# Patient Record
Sex: FEMALE | Race: WHITE | Employment: OTHER | URBAN - METROPOLITAN AREA
[De-identification: names, ages, dates, MRNs, and addresses within clinical notes are randomized per-mention and may not be internally consistent; named-entity substitution may affect disease eponyms.]

---

## 2021-09-17 ENCOUNTER — HOSPITAL ENCOUNTER (INPATIENT)
Age: 61
LOS: 5 days | Discharge: HOME | DRG: 442 | End: 2021-09-22
Attending: EMERGENCY MEDICINE | Admitting: INTERNAL MEDICINE
Payer: MEDICARE

## 2021-09-17 ENCOUNTER — APPOINTMENT (OUTPATIENT)
Dept: CT IMAGING | Age: 61
DRG: 442 | End: 2021-09-17
Attending: EMERGENCY MEDICINE
Payer: MEDICARE

## 2021-09-17 DIAGNOSIS — K72.00 ACUTE LIVER FAILURE WITHOUT HEPATIC COMA: Primary | ICD-10-CM

## 2021-09-17 PROBLEM — R91.8 PULMONARY NODULES: Status: ACTIVE | Noted: 2021-09-17

## 2021-09-17 PROBLEM — F03.90 MAJOR NEUROCOGNITIVE DISORDER (HCC): Status: ACTIVE | Noted: 2021-09-17

## 2021-09-17 PROBLEM — F03.90 DEMENTIA (HCC): Status: ACTIVE | Noted: 2021-09-17

## 2021-09-17 PROBLEM — R17 JAUNDICE: Status: ACTIVE | Noted: 2021-09-17

## 2021-09-17 LAB
ALBUMIN SERPL-MCNC: 2.9 G/DL (ref 3.5–5)
ALBUMIN/GLOB SERPL: 0.7 {RATIO} (ref 1.2–3.5)
ALP SERPL-CCNC: 972 U/L (ref 50–136)
ALT SERPL-CCNC: 1340 U/L (ref 12–65)
ANION GAP SERPL CALC-SCNC: 8 MMOL/L (ref 7–16)
APTT PPP: 34.1 SEC (ref 24.1–35.1)
AST SERPL-CCNC: 958 U/L (ref 15–37)
BASOPHILS # BLD: 0 K/UL (ref 0–0.2)
BASOPHILS NFR BLD: 1 % (ref 0–2)
BILIRUB DIRECT SERPL-MCNC: 7.4 MG/DL
BILIRUB SERPL-MCNC: 9.1 MG/DL (ref 0.2–1.1)
BUN SERPL-MCNC: 10 MG/DL (ref 6–23)
CALCIUM SERPL-MCNC: 8.7 MG/DL (ref 8.3–10.4)
CHLORIDE SERPL-SCNC: 104 MMOL/L (ref 98–107)
CO2 SERPL-SCNC: 27 MMOL/L (ref 21–32)
CREAT SERPL-MCNC: 0.32 MG/DL (ref 0.6–1)
DIFFERENTIAL METHOD BLD: ABNORMAL
EOSINOPHIL # BLD: 0.4 K/UL (ref 0–0.8)
EOSINOPHIL NFR BLD: 6 % (ref 0.5–7.8)
ERYTHROCYTE [DISTWIDTH] IN BLOOD BY AUTOMATED COUNT: 17.4 % (ref 11.9–14.6)
GLOBULIN SER CALC-MCNC: 4.2 G/DL (ref 2.3–3.5)
GLUCOSE SERPL-MCNC: 100 MG/DL (ref 65–100)
HAV IGM SER QL: NONREACTIVE
HBV CORE IGM SER QL: NONREACTIVE
HBV SURFACE AG SER QL: NONREACTIVE
HCT VFR BLD AUTO: 37.2 % (ref 35.8–46.3)
HCV AB SER QL: NONREACTIVE
HGB BLD-MCNC: 12 G/DL (ref 11.7–15.4)
IMM GRANULOCYTES # BLD AUTO: 0 K/UL (ref 0–0.5)
IMM GRANULOCYTES NFR BLD AUTO: 1 % (ref 0–5)
INR PPP: 1
LIPASE SERPL-CCNC: 197 U/L (ref 73–393)
LYMPHOCYTES # BLD: 0.8 K/UL (ref 0.5–4.6)
LYMPHOCYTES NFR BLD: 13 % (ref 13–44)
MCH RBC QN AUTO: 27.3 PG (ref 26.1–32.9)
MCHC RBC AUTO-ENTMCNC: 32.3 G/DL (ref 31.4–35)
MCV RBC AUTO: 84.5 FL (ref 79.6–97.8)
MONOCYTES # BLD: 0.7 K/UL (ref 0.1–1.3)
MONOCYTES NFR BLD: 11 % (ref 4–12)
NEUTS SEG # BLD: 4.3 K/UL (ref 1.7–8.2)
NEUTS SEG NFR BLD: 69 % (ref 43–78)
NRBC # BLD: 0 K/UL (ref 0–0.2)
PLATELET # BLD AUTO: 304 K/UL (ref 150–450)
PMV BLD AUTO: 10.9 FL (ref 9.4–12.3)
POTASSIUM SERPL-SCNC: 3.5 MMOL/L (ref 3.5–5.1)
PROT SERPL-MCNC: 7.1 G/DL (ref 6.3–8.2)
PROTHROMBIN TIME: 13.4 SEC (ref 12.6–14.5)
RBC # BLD AUTO: 4.4 M/UL (ref 4.05–5.2)
SODIUM SERPL-SCNC: 139 MMOL/L (ref 136–145)
WBC # BLD AUTO: 6.2 K/UL (ref 4.3–11.1)

## 2021-09-17 PROCEDURE — 85610 PROTHROMBIN TIME: CPT

## 2021-09-17 PROCEDURE — 85025 COMPLETE CBC W/AUTO DIFF WBC: CPT

## 2021-09-17 PROCEDURE — 99284 EMERGENCY DEPT VISIT MOD MDM: CPT

## 2021-09-17 PROCEDURE — 83690 ASSAY OF LIPASE: CPT

## 2021-09-17 PROCEDURE — 80307 DRUG TEST PRSMV CHEM ANLYZR: CPT

## 2021-09-17 PROCEDURE — 74011000258 HC RX REV CODE- 258: Performed by: EMERGENCY MEDICINE

## 2021-09-17 PROCEDURE — 65270000029 HC RM PRIVATE

## 2021-09-17 PROCEDURE — 82248 BILIRUBIN DIRECT: CPT

## 2021-09-17 PROCEDURE — 80143 DRUG ASSAY ACETAMINOPHEN: CPT

## 2021-09-17 PROCEDURE — 74011000636 HC RX REV CODE- 636: Performed by: EMERGENCY MEDICINE

## 2021-09-17 PROCEDURE — 80074 ACUTE HEPATITIS PANEL: CPT

## 2021-09-17 PROCEDURE — 74177 CT ABD & PELVIS W/CONTRAST: CPT

## 2021-09-17 PROCEDURE — 80053 COMPREHEN METABOLIC PANEL: CPT

## 2021-09-17 PROCEDURE — 85730 THROMBOPLASTIN TIME PARTIAL: CPT

## 2021-09-17 PROCEDURE — 82077 ASSAY SPEC XCP UR&BREATH IA: CPT

## 2021-09-17 PROCEDURE — 74011250636 HC RX REV CODE- 250/636: Performed by: EMERGENCY MEDICINE

## 2021-09-17 RX ORDER — SODIUM CHLORIDE 0.9 % (FLUSH) 0.9 %
5-10 SYRINGE (ML) INJECTION EVERY 8 HOURS
Status: DISCONTINUED | OUTPATIENT
Start: 2021-09-17 | End: 2021-09-22 | Stop reason: HOSPADM

## 2021-09-17 RX ORDER — POLYETHYLENE GLYCOL 3350 17 G/17G
17 POWDER, FOR SOLUTION ORAL DAILY PRN
Status: DISCONTINUED | OUTPATIENT
Start: 2021-09-17 | End: 2021-09-22 | Stop reason: HOSPADM

## 2021-09-17 RX ORDER — SODIUM CHLORIDE 0.9 % (FLUSH) 0.9 %
10 SYRINGE (ML) INJECTION
Status: COMPLETED | OUTPATIENT
Start: 2021-09-17 | End: 2021-09-17

## 2021-09-17 RX ORDER — ONDANSETRON 4 MG/1
4 TABLET, ORALLY DISINTEGRATING ORAL
Status: DISCONTINUED | OUTPATIENT
Start: 2021-09-17 | End: 2021-09-22 | Stop reason: HOSPADM

## 2021-09-17 RX ORDER — SODIUM CHLORIDE 9 MG/ML
50 INJECTION, SOLUTION INTRAVENOUS CONTINUOUS
Status: DISCONTINUED | OUTPATIENT
Start: 2021-09-17 | End: 2021-09-20

## 2021-09-17 RX ORDER — SODIUM CHLORIDE 0.9 % (FLUSH) 0.9 %
5-10 SYRINGE (ML) INJECTION AS NEEDED
Status: DISCONTINUED | OUTPATIENT
Start: 2021-09-17 | End: 2021-09-22 | Stop reason: HOSPADM

## 2021-09-17 RX ORDER — SODIUM CHLORIDE 0.9 % (FLUSH) 0.9 %
5-40 SYRINGE (ML) INJECTION EVERY 8 HOURS
Status: DISCONTINUED | OUTPATIENT
Start: 2021-09-17 | End: 2021-09-22 | Stop reason: HOSPADM

## 2021-09-17 RX ORDER — ONDANSETRON 2 MG/ML
4 INJECTION INTRAMUSCULAR; INTRAVENOUS
Status: DISCONTINUED | OUTPATIENT
Start: 2021-09-17 | End: 2021-09-22 | Stop reason: HOSPADM

## 2021-09-17 RX ORDER — SODIUM CHLORIDE 0.9 % (FLUSH) 0.9 %
5-40 SYRINGE (ML) INJECTION AS NEEDED
Status: DISCONTINUED | OUTPATIENT
Start: 2021-09-17 | End: 2021-09-22 | Stop reason: HOSPADM

## 2021-09-17 RX ORDER — ACETAMINOPHEN 325 MG/1
650 TABLET ORAL
Status: DISCONTINUED | OUTPATIENT
Start: 2021-09-17 | End: 2021-09-17

## 2021-09-17 RX ORDER — ENOXAPARIN SODIUM 100 MG/ML
40 INJECTION SUBCUTANEOUS DAILY
Status: DISCONTINUED | OUTPATIENT
Start: 2021-09-18 | End: 2021-09-17

## 2021-09-17 RX ORDER — ACETAMINOPHEN 650 MG/1
650 SUPPOSITORY RECTAL
Status: DISCONTINUED | OUTPATIENT
Start: 2021-09-17 | End: 2021-09-17

## 2021-09-17 RX ADMIN — Medication 5 ML: at 18:50

## 2021-09-17 RX ADMIN — SODIUM CHLORIDE 125 ML/HR: 900 INJECTION, SOLUTION INTRAVENOUS at 18:49

## 2021-09-17 RX ADMIN — SODIUM CHLORIDE 100 ML: 900 INJECTION, SOLUTION INTRAVENOUS at 20:27

## 2021-09-17 RX ADMIN — DIATRIZOATE MEGLUMINE AND DIATRIZOATE SODIUM 15 ML: 600; 100 SOLUTION ORAL; RECTAL at 18:49

## 2021-09-17 RX ADMIN — IOPAMIDOL 100 ML: 755 INJECTION, SOLUTION INTRAVENOUS at 20:26

## 2021-09-17 RX ADMIN — Medication 10 ML: at 20:27

## 2021-09-17 NOTE — ED TRIAGE NOTES
Patient pcems from home with mask in place. Patient reports family noticed jaundice today. Patient extremely hard of hearing. Uses dry erase board to communicate. Patient denies alcohol usage. Patient reports generalized abdominal pain.   Denies n/v. 112/70; 98.3;

## 2021-09-18 ENCOUNTER — APPOINTMENT (OUTPATIENT)
Dept: ULTRASOUND IMAGING | Age: 61
DRG: 442 | End: 2021-09-18
Attending: NURSE PRACTITIONER
Payer: MEDICARE

## 2021-09-18 PROBLEM — E80.6 HYPERBILIRUBINEMIA: Status: ACTIVE | Noted: 2021-09-18

## 2021-09-18 PROBLEM — R74.01 TRANSAMINITIS: Status: ACTIVE | Noted: 2021-09-18

## 2021-09-18 LAB
ALBUMIN SERPL-MCNC: 2.2 G/DL (ref 3.5–5)
ALBUMIN/GLOB SERPL: 0.6 {RATIO} (ref 1.2–3.5)
ALP SERPL-CCNC: 772 U/L (ref 50–136)
ALT SERPL-CCNC: 1040 U/L (ref 12–65)
AMPHET UR QL SCN: NEGATIVE
ANION GAP SERPL CALC-SCNC: 9 MMOL/L (ref 7–16)
APAP SERPL-MCNC: <2 UG/ML (ref 10–30)
AST SERPL-CCNC: 766 U/L (ref 15–37)
BARBITURATES UR QL SCN: NEGATIVE
BENZODIAZ UR QL: NEGATIVE
BILIRUB SERPL-MCNC: 8.5 MG/DL (ref 0.2–1.1)
BUN SERPL-MCNC: 7 MG/DL (ref 6–23)
CALCIUM SERPL-MCNC: 8.4 MG/DL (ref 8.3–10.4)
CANNABINOIDS UR QL SCN: NEGATIVE
CHLORIDE SERPL-SCNC: 111 MMOL/L (ref 98–107)
CO2 SERPL-SCNC: 26 MMOL/L (ref 21–32)
COCAINE UR QL SCN: NEGATIVE
CREAT SERPL-MCNC: 0.23 MG/DL (ref 0.6–1)
ETHANOL SERPL-MCNC: <3 MG/DL
GLOBULIN SER CALC-MCNC: 3.6 G/DL (ref 2.3–3.5)
GLUCOSE SERPL-MCNC: 75 MG/DL (ref 65–100)
METHADONE UR QL: NEGATIVE
OPIATES UR QL: NEGATIVE
PCP UR QL: NEGATIVE
POTASSIUM SERPL-SCNC: 3.2 MMOL/L (ref 3.5–5.1)
PROT SERPL-MCNC: 5.8 G/DL (ref 6.3–8.2)
SODIUM SERPL-SCNC: 146 MMOL/L (ref 136–145)

## 2021-09-18 PROCEDURE — 86038 ANTINUCLEAR ANTIBODIES: CPT

## 2021-09-18 PROCEDURE — 65270000029 HC RM PRIVATE

## 2021-09-18 PROCEDURE — 93976 VASCULAR STUDY: CPT

## 2021-09-18 PROCEDURE — 74011250636 HC RX REV CODE- 250/636: Performed by: EMERGENCY MEDICINE

## 2021-09-18 PROCEDURE — 83516 IMMUNOASSAY NONANTIBODY: CPT

## 2021-09-18 PROCEDURE — 36415 COLL VENOUS BLD VENIPUNCTURE: CPT

## 2021-09-18 PROCEDURE — 80053 COMPREHEN METABOLIC PANEL: CPT

## 2021-09-18 PROCEDURE — 2709999900 HC NON-CHARGEABLE SUPPLY

## 2021-09-18 PROCEDURE — 86235 NUCLEAR ANTIGEN ANTIBODY: CPT

## 2021-09-18 RX ADMIN — Medication 10 ML: at 14:14

## 2021-09-18 RX ADMIN — Medication 10 ML: at 05:54

## 2021-09-18 RX ADMIN — Medication 10 ML: at 14:15

## 2021-09-18 RX ADMIN — SODIUM CHLORIDE 125 ML/HR: 900 INJECTION, SOLUTION INTRAVENOUS at 23:01

## 2021-09-18 RX ADMIN — SODIUM CHLORIDE 125 ML/HR: 900 INJECTION, SOLUTION INTRAVENOUS at 12:46

## 2021-09-18 NOTE — PROGRESS NOTES
Progress Note    Patient: Tati Mcdonald MRN: [id number]  SSN: [id number]    YOB: 1960  Age: 62 y.o. Sex: female      Admit Date: 9/17/2021    LOS: 1 day     Assessment and Plan:   62 y.o. female with a past medical history significant for neurocognitive disorder, prior CVA, that presented in the setting of jaundice    1. Nonobstructive jaundice / Transaminitis / Hyperbilirubinemia  -Family history of Fabry disease  -No signs of hemolysis  -CT of the abdomen without acute intra-abdominal pathology  -Negative viral hepatitis panel  -Negative acetaminophen level  -Obtain abdominal Doppler ultrasound  -Obtain NELY, ASMA, AMA  -Monitor LFTs  -Gastroenterology recommendations appreciated    2. Pulmonary nodules  -Chronic  -Outpatient follow-up    3. Neurocognitive disorder  -No behavioral disturbances  -Monitor    DVT prophylaxis with SCDs      Subjective:   62 y.o. female with a past medical history significant for neurocognitive disorder, prior CVA, that presented in the setting of jaundice. Patient seen and examined at bedside. This morning the patient very little tired. Otherwise denies any chest pain, no abdominal pain, no nausea vomiting or diarrhea. Objective:     Vitals:    09/18/21 0023 09/18/21 0450 09/18/21 0813 09/18/21 1109   BP: (!) 117/57 115/72 (!) 110/57 95/61   Pulse: 77 69 71 85   Resp: 18 16     Temp: 98.1 °F (36.7 °C) 98.1 °F (36.7 °C) 98.4 °F (36.9 °C) 98.6 °F (37 °C)   SpO2: 96% 95% 98%         Intake and Output:  Current Shift: No intake/output data recorded.   Last three shifts: 09/16 1901 - 09/18 0700  In: 100 [I.V.:100]  Out: -     ROS  10 ROS negative except from stated on subjective    Physical Exam:   General: Alert, NAD  HEENT: NC/AT, EOM are intact  Neck: supple, no JVD  Cardiovascular: RRR, S1, S2, no murmurs  Respiratory: Lungs are clear, no wheezes or rales  Abdomen: Soft, NT, ND  Back: No CVA tenderness, no paraspinal tenderness  Extremities: LE without pedal edema, no erythema  Neuro: CN are intact, no focal deficits  Skin: no rash or ulcers  Psych: good mood and affect    Lab/Data Review:  I have personally reviewed patients laboratory data showing  Recent Results (from the past 24 hour(s))   BILIRUBIN, DIRECT    Collection Time: 09/17/21  5:03 PM   Result Value Ref Range    Bilirubin, direct 7.4 (H) <0.4 MG/DL   CBC WITH AUTOMATED DIFF    Collection Time: 09/17/21  5:15 PM   Result Value Ref Range    WBC 6.2 4.3 - 11.1 K/uL    RBC 4.40 4.05 - 5.2 M/uL    HGB 12.0 11.7 - 15.4 g/dL    HCT 37.2 35.8 - 46.3 %    MCV 84.5 79.6 - 97.8 FL    MCH 27.3 26.1 - 32.9 PG    MCHC 32.3 31.4 - 35.0 g/dL    RDW 17.4 (H) 11.9 - 14.6 %    PLATELET 726 233 - 135 K/uL    MPV 10.9 9.4 - 12.3 FL    ABSOLUTE NRBC 0.00 0.0 - 0.2 K/uL    DF AUTOMATED      NEUTROPHILS 69 43 - 78 %    LYMPHOCYTES 13 13 - 44 %    MONOCYTES 11 4.0 - 12.0 %    EOSINOPHILS 6 0.5 - 7.8 %    BASOPHILS 1 0.0 - 2.0 %    IMMATURE GRANULOCYTES 1 0.0 - 5.0 %    ABS. NEUTROPHILS 4.3 1.7 - 8.2 K/UL    ABS. LYMPHOCYTES 0.8 0.5 - 4.6 K/UL    ABS. MONOCYTES 0.7 0.1 - 1.3 K/UL    ABS. EOSINOPHILS 0.4 0.0 - 0.8 K/UL    ABS. BASOPHILS 0.0 0.0 - 0.2 K/UL    ABS. IMM. GRANS. 0.0 0.0 - 0.5 K/UL   METABOLIC PANEL, COMPREHENSIVE    Collection Time: 09/17/21  5:15 PM   Result Value Ref Range    Sodium 139 136 - 145 mmol/L    Potassium 3.5 3.5 - 5.1 mmol/L    Chloride 104 98 - 107 mmol/L    CO2 27 21 - 32 mmol/L    Anion gap 8 7 - 16 mmol/L    Glucose 100 65 - 100 mg/dL    BUN 10 6 - 23 MG/DL    Creatinine 0.32 (L) 0.6 - 1.0 MG/DL    GFR est AA >60 >60 ml/min/1.73m2    GFR est non-AA >60 >60 ml/min/1.73m2    Calcium 8.7 8.3 - 10.4 MG/DL    Bilirubin, total 9.1 (H) 0.2 - 1.1 MG/DL    ALT (SGPT) 1,340 (H) 12 - 65 U/L    AST (SGOT) 958 (H) 15 - 37 U/L    Alk.  phosphatase 972 (H) 50 - 136 U/L    Protein, total 7.1 6.3 - 8.2 g/dL    Albumin 2.9 (L) 3.5 - 5.0 g/dL    Globulin 4.2 (H) 2.3 - 3.5 g/dL    A-G Ratio 0.7 (L) 1.2 - 3.5     LIPASE Collection Time: 09/17/21  5:15 PM   Result Value Ref Range    Lipase 197 73 - 393 U/L   ACETAMINOPHEN    Collection Time: 09/17/21  5:15 PM   Result Value Ref Range    Acetaminophen level <2 (L) 10.0 - 30.0 ug/mL   ETHYL ALCOHOL    Collection Time: 09/17/21  5:15 PM   Result Value Ref Range    ALCOHOL(ETHYL),SERUM <3 MG/DL   HEPATITIS PANEL, ACUTE    Collection Time: 09/17/21  7:05 PM   Result Value Ref Range    Hepatitis A, IgM NONREACTIVE NR      Hepatitis B core, IgM NONREACTIVE NR      Hep B Surface Ag NONREACTIVE NR      Hepatitis C virus Ab NONREACTIVE NR     PTT    Collection Time: 09/17/21  9:40 PM   Result Value Ref Range    aPTT 34.1 24.1 - 35.1 SEC   PROTHROMBIN TIME + INR    Collection Time: 09/17/21  9:40 PM   Result Value Ref Range    Prothrombin time 13.4 12.6 - 14.5 sec    INR 1.0     METABOLIC PANEL, COMPREHENSIVE    Collection Time: 09/18/21  5:48 AM   Result Value Ref Range    Sodium 146 (H) 136 - 145 mmol/L    Potassium 3.2 (L) 3.5 - 5.1 mmol/L    Chloride 111 (H) 98 - 107 mmol/L    CO2 26 21 - 32 mmol/L    Anion gap 9 7 - 16 mmol/L    Glucose 75 65 - 100 mg/dL    BUN 7 6 - 23 MG/DL    Creatinine 0.23 (L) 0.6 - 1.0 MG/DL    GFR est AA >60 >60 ml/min/1.73m2    GFR est non-AA >60 >60 ml/min/1.73m2    Calcium 8.4 8.3 - 10.4 MG/DL    Bilirubin, total 8.5 (H) 0.2 - 1.1 MG/DL    ALT (SGPT) 1,040 (H) 12 - 65 U/L    AST (SGOT) 766 (H) 15 - 37 U/L    Alk. phosphatase 772 (H) 50 - 136 U/L    Protein, total 5.8 (L) 6.3 - 8.2 g/dL    Albumin 2.2 (L) 3.5 - 5.0 g/dL    Globulin 3.6 (H) 2.3 - 3.5 g/dL    A-G Ratio 0.6 (L) 1.2 - 3.5        All Micro Results     None           Image:  I have personally reviewed patients imaging showing  CT ABD PELV W CONT   Final Result   No acute intra-abdominal or pelvis finding identified.                DUPLEX ABD VISC ART/EMILIANA/ORGANS LIMITED (11535)    (Results Pending)        Hospital problems     Patient Active Problem List   Diagnosis Code    Jaundice R17    Pulmonary nodules R91.8    Dementia (HCC) F03.90    Major neurocognitive disorder (HCC) F01.50    Transaminitis R74.01    Hyperbilirubinemia E80.6        I have reviewed, updated, and verified this note's content and spent 38 minutes of my 42 minutes visit performing counseling and coordination of care regarding medical management.        Signed By: Quiana Gonzalez MD     September 18, 2021

## 2021-09-18 NOTE — PROGRESS NOTES
Pt states she does not think she takes any medications at home.  Attempted to call emergency contact, Cleopatra Stinson, listed but was unsuccessful

## 2021-09-18 NOTE — PROGRESS NOTES
Problem: Falls - Risk of  Goal: *Absence of Falls  Description: Document Thorntondima Castano Fall Risk and appropriate interventions in the flowsheet. Outcome: Progressing Towards Goal  Note: Fall Risk Interventions:  Mobility Interventions: Bed/chair exit alarm, Communicate number of staff needed for ambulation/transfer    Mentation Interventions: Bed/chair exit alarm, Door open when patient unattended         Elimination Interventions: Bed/chair exit alarm, Call light in reach, Toileting schedule/hourly rounds              Problem: Patient Education: Go to Patient Education Activity  Goal: Patient/Family Education  Outcome: Progressing Towards Goal     Problem: Pressure Injury - Risk of  Goal: *Prevention of pressure injury  Description: Document Linus Scale and appropriate interventions in the flowsheet.   Outcome: Progressing Towards Goal  Note: Pressure Injury Interventions:       Moisture Interventions: Absorbent underpads, Apply protective barrier, creams and emollients, Internal/External urinary devices, Check for incontinence Q2 hours and as needed    Activity Interventions: Pressure redistribution bed/mattress(bed type), Increase time out of bed         Nutrition Interventions: Document food/fluid/supplement intake    Friction and Shear Interventions: Apply protective barrier, creams and emollients, Foam dressings/transparent film/skin sealants                Problem: Patient Education: Go to Patient Education Activity  Goal: Patient/Family Education  Outcome: Progressing Towards Goal

## 2021-09-18 NOTE — PROGRESS NOTES
09/18/21 0010   Dual Skin Pressure Injury Assessment   Dual Skin Pressure Injury Assessment X   Second Care Provider (Based on [address] [place] [address]) DIONICIO Douglas   Gluteal Cleft  Fissure vs Pressure Injury   Skin Integumentary   Skin Integumentary (WDL) X   Skin Color Jaundiced   Skin Condition/Temp Warm;Dry;Flaky;Fragile   Skin Integrity Wound (add Wound LDA)   Turgor Epidermis thin w/ loss of subcut tissue    Pressure  Injury Documentation Pressure Injury Noted-See Wound LDA to Document   open sore to sacrum in between gluteal fold

## 2021-09-18 NOTE — ED PROVIDER NOTES
43-year-old lady presents with concerns about jaundice. Patient is here with her daughter who is mostly communicating as the patient apparently has baseline cognitive issues and had some deafness and does not know sign language. Daughter says that the patient recently got home from a rehab related to her cognitive issues and was noted to be jaundiced as of yesterday. The daughter says that she lives with her mother and prior to yesterday she did not notice any significant jaundice. Patient has had no fevers, vomiting, diarrhea, cough, or difficulty breathing. No prior history of gallbladder surgery. No other associated symptoms. Elements of this note were created using speech recognition software. As such, errors of speech recognition may be present. No past medical history on file. No past surgical history on file. No family history on file. Social History     Socioeconomic History    Marital status:      Spouse name: Not on file    Number of children: Not on file    Years of education: Not on file    Highest education level: Not on file   Occupational History    Not on file   Tobacco Use    Smoking status: Not on file   Substance and Sexual Activity    Alcohol use: Not on file    Drug use: Not on file    Sexual activity: Not on file   Other Topics Concern    Not on file   Social History Narrative    Not on file     Social Determinants of Health     Financial Resource Strain:     Difficulty of Paying Living Expenses:    Food Insecurity:     Worried About Running Out of Food in the Last Year:     [address] in the Last Year:    Transportation Needs:     Lack of Transportation (Medical):      Lack of Transportation (Non-Medical):    Physical Activity:     Days of Exercise per Week:     Minutes of Exercise per Session:    Stress:     Feeling of Stress :    Social Connections:     Frequency of Communication with Friends and Family:     Frequency of Social Gatherings with Friends and Family:     Attends Yarsanism Services:     Active Member of Clubs or Organizations:     Attends Club or Organization Meetings:     Marital Status:    Intimate Partner Violence:     Fear of Current or Ex-Partner:     Emotionally Abused:     Physically Abused:     Sexually Abused: ALLERGIES: Patient has no known allergies. Review of Systems   Constitutional: Negative for chills, diaphoresis and fever. HENT: Negative for congestion, rhinorrhea and sore throat. Eyes: Negative for redness and visual disturbance. Respiratory: Negative for cough, chest tightness, shortness of breath and wheezing. Cardiovascular: Negative for chest pain and palpitations. Gastrointestinal: Negative for abdominal pain, blood in stool, diarrhea, nausea and vomiting. Endocrine: Negative for polydipsia and polyuria. Genitourinary: Negative for dysuria and hematuria. Musculoskeletal: Negative for arthralgias, myalgias and neck stiffness. Skin: Positive for color change. Negative for rash. Allergic/Immunologic: Negative for environmental allergies and food allergies. Neurological: Negative for dizziness, weakness and headaches. Hematological: Negative for adenopathy. Does not bruise/bleed easily. Psychiatric/Behavioral: Negative for confusion and sleep disturbance. The patient is not nervous/anxious. Vitals:    09/17/21 1701 09/17/21 2052   BP: 119/70    Pulse: 87    Resp: 16    Temp: 97.7 °F (36.5 °C)    SpO2: 95% 95%            Physical Exam  Vitals and nursing note reviewed. Constitutional:       General: She is not in acute distress. Appearance: She is well-developed. She is not toxic-appearing. HENT:      Head: Normocephalic and atraumatic. Mouth/Throat:      Comments: Jaundice mucous membranes  Eyes:      General: Scleral icterus present. Right eye: No discharge. Left eye: No discharge.       Conjunctiva/sclera: Conjunctivae normal.      Pupils: Pupils are equal, round, and reactive to light. Cardiovascular:      Rate and Rhythm: Normal rate and regular rhythm. Heart sounds: Normal heart sounds. Pulmonary:      Effort: Pulmonary effort is normal. No respiratory distress. Breath sounds: Normal breath sounds. No wheezing or rales. Chest:      Chest wall: No tenderness. Abdominal:      General: Bowel sounds are normal. There is no distension. Palpations: Abdomen is soft. Tenderness: There is no guarding or rebound. Musculoskeletal:         General: No tenderness. Normal range of motion. Cervical back: Normal range of motion. No rigidity. Lymphadenopathy:      Cervical: No cervical adenopathy. Skin:     General: Skin is warm and dry. Coloration: Skin is jaundiced. Neurological:      General: No focal deficit present. Mental Status: She is alert and oriented to person, place, and time. Psychiatric:         Mood and Affect: Mood normal.         Behavior: Behavior normal.          MDM  Number of Diagnoses or Management Options  Diagnosis management comments: I will do a CT scan to evaluate for liver mass or other obstructive lesion. I will check a basic blood work including hepatitis panel. ED Course as of Sep 17 2201   Fri Sep 17, 2021   2114 CT scan is negative. Hepatitis panel is nonreactive.   Coags are pending.    [AC]      ED Course User Index  [AC] Sammy Clemens MD       Procedures

## 2021-09-18 NOTE — CONSULTS
[facility] Consult Note       Primary GI Physician: new    Referring Provider:      Consult Date:  9/18/2021    Admit Date:  9/17/2021    Chief Complaint:  Jaundice    Subjective:     History of Present Illness:  Patient is a 62 y.o. female with PMH CVA, memory loss with intracranial stenosis, pulmonary nodules, deafness, former tobacco abuse, seen in consultation at the request of Dr. Edgar Torres for painless jaundice. She presented to the ED 9/17 for the same, accompanied by her daughter who noted onset of jaundice that day. Patient had no n/v/d or abdominal pain, and denied fever. Labs on arrival revealed normal CBC with normal INR,  electrolytes and renal function but elevation in tbili at 9.1, alb 2.9, ALT 1340,  and alk phos 972. Direct bili 7.4. Viral hep panel negative. CT A/P with no acute abnormality to account for symptoms. Patient's daughter reported no medications prior to admission at [facility] and no otc medications on a regular basis. Drug screen negative as was Tylenol level. Previous admission at [facility] reported this summer for weakness. Patient went for rehab following that admission, recently discharged to home. Med list today is obtained from recent rehab center notes in care everywhere. She has had no prior hx of jaundice or icterus and there is no known hx of liver disease. CT A/P W CONTRAST 9/17/21  CT ABDOMEN: Only some slight dependent atelectasis findings suggested at the  bases. There is no free air, no significant ascites. Ventral abdominal wall intact. Liver is unremarkable. Gallbladder, biliary tree and pancreas not remarkable. There is not frankly  obvious liver cirrhosis changes. Stomach and duodenal sweep not remarkable. Spleen is not significantly enlarged. Adrenals and kidneys not remarkable. Aorta is normal in size. Moderate aortic atherosclerotic calcifications. No  acute vascular finding. No enlarged adenopathy.   Small bowel loops and colon nondistended with no inflammation or lesion. No acute/aggressive bone lesion.   CT PELVIS: Pelvic small bowel loops, the rectosigmoid colon and cecum are  nondistended, with no significant inflammation. No acute vascular finding. No enlarged pelvis/groin lymphadenopathy. The bladder is midline; appearance unremarkable. Uterus, adnexa not remarkable for age. No acute/aggressive bone legion.   IMPRESSION  No acute intra-abdominal or pelvis finding identified. PMH:  No past medical history on file. PSH:  No past surgical history on file.     Allergies:  No Known Allergies    Home Medications:  Prior to Admission medications    Not on File    Med list per nursing home notes in care everywhere:  Medication Dose Frequency Status Start Date End Date   Senna Tablet 8.6 MG 2 tbsp   Other / See Nurse Notes 09/08/2021     Acetaminophen Tablet 325 MG      07/06/2021     Acetaminophen Tablet 325 MG 2      07/06/2021     Ticagrelor Tablet 90 MG 1  12 h   08/31/2021     Docusate Sodium Capsule 100 MG 1  12 h   08/31/2021     Atorvastatin Calcium Tablet 80 MG 1      09/08/2021     Cholecalciferol Tablet 10 MCG (400 UNIT) 2 24 h   09/08/2021     Aspirin Tablet Chewable 81 MG  24 h   09/08/2021     Docusate Sodium Capsule 100 MG 1      09/08/2021     Ticagrelor Tablet 90 MG 1      09/08/2021          Hospital Medications:  Current Facility-Administered Medications   Medication Dose Route Frequency    sodium chloride (NS) flush 5-10 mL  5-10 mL IntraVENous Q8H    sodium chloride (NS) flush 5-10 mL  5-10 mL IntraVENous PRN    0.9% sodium chloride infusion  125 mL/hr IntraVENous CONTINUOUS    sodium chloride (NS) flush 5-40 mL  5-40 mL IntraVENous Q8H    sodium chloride (NS) flush 5-40 mL  5-40 mL IntraVENous PRN    polyethylene glycol (MIRALAX) packet 17 g  17 g Oral DAILY PRN    ondansetron (ZOFRAN ODT) tablet 4 mg  4 mg Oral Q8H PRN    Or    ondansetron (ZOFRAN) injection 4 mg  4 mg IntraVENous Q6H PRN Social History:  Social History     Tobacco Use    Smoking status: Not on file   Substance Use Topics    Alcohol use: Not on file       Family History:  No family history on file. Review of Systems:  A detailed 10 system ROS is obtained, with pertinent positives as listed above. All others are negative. Diet:  NPO    Objective:     Physical Exam:  Vitals:  Visit Vitals  BP (!) 110/57   Pulse 71   Temp 98.4 °F (36.9 °C)   Resp 16   SpO2 98%     Gen:  Pt is alert, cooperative, no acute distress; difficulty hearing but able to verbalize  Skin:  Extremities and face reveal no rashes. Jaundiced. HEENT: Sclerae icteric. Extra-occular muscles are intact. No oral ulcers. No abnormal pigmentation of the lips. The neck is supple. Cardiovascular: Regular rate and rhythm. No murmurs, gallops, or rubs. Respiratory:  Comfortable breathing with no accessory muscle use. Clear breath sounds anteriorly with no wheezes, rales, or rhonchi. GI:  Abdomen nondistended, soft, and nontender. Normal active bowel sounds. No enlargement of the liver or spleen. No masses palpable. Rectal:  Deferred  Musculoskeletal:  No pitting edema of the lower legs. Lymphatic:  No cervical or supraclavicular adenopathy.     Laboratory:    Recent Labs     09/18/21  0548 09/17/21  2140 09/17/21  1715 09/17/21  1703   WBC  --   --  6.2  --    HGB  --   --  12.0  --    HCT  --   --  37.2  --    PLT  --   --  304  --    MCV  --   --  84.5  --    *  --  139  --    K 3.2*  --  3.5  --    *  --  104  --    CO2 26  --  27  --    BUN 7  --  10  --    CREA 0.23*  --  0.32*  --    CA 8.4  --  8.7  --    GLU 75  --  100  --    *  --  972*  --    *  --  958*  --    ALT 1,040*  --  1,340*  --    TBILI 8.5*  --  9.1*  --    CBIL  --   --   --  7.4*   ALB 2.2*  --  2.9*  --    TP 5.8*  --  7.1  --    LPSE  --   --  197  --    PTP  --  13.4  --   --    INR  --  1.0  --   --    APTT  --  34.1  --   --           Assessment: Principal Problem:    Transaminitis (9/18/2021)    Active Problems:    Jaundice (9/17/2021)      Pulmonary nodules (9/17/2021)      Dementia ([address] (9/17/2021)      Major neurocognitive disorder ([address] (9/17/2021)      Hyperbilirubinemia (9/18/2021)        Plan:     63 yo female is seen in consultation for evaluation of elevated liver chemistry with jaundice. She presented to the ED yesterday accompanied by her daughter who reported onset of jaundice in the last 24 hours. She reports no n/v/d or abdominal pain and no fever/chills. Patient admitted in July to [facility] for cognitive changes and diagnosed with intracranial artery stenosis; she has had early onset of memory loss and is dependent on her family at present. Although new medications were started at that admission, her daughter reports infrequent otc medications and no etoh use. CT imaging on admission without acute findings and viral hepatitis panel is negative. Pattern of liver chemistry elevation is mixed but most likely related to cholestasis, medication-induced, or even possibly auto-immune. It does not appear obstructive. 1.  Further evaluation with serology to include NELY, ASMA, AMA  2. Hepatic ultrasound with doppler imaging  3. Follow liver chemistry which appears improved today  4. Ok for diet  5. Further recommendations to be made based on findings of above    Patient is seen and examined in collaboration with Dr. Aj Goddard. Assessment and plan as per Dr. Aj Goddard.   Ute Russo NP

## 2021-09-18 NOTE — H&P
Hospitalist History and Physical   Admit Date:  2021  6:28 PM   Name:  Marvin Christianson   Age:  62 y.o. Sex:  female  :  1960   MRN:  [id number]   Room:  [facility]    Presenting Complaint: Jaundice    Reason(s) for Admission: Jaundice [R17]  Major neurocognitive disorder ([facility] [F01.50]  Dementia ([facility] [F03.90]  Pulmonary nodules [R91.8]     History of Present Illness:   Marvin Christianson is a 62 y.o. female with a past medical history significant for major neurocognitive disorder moderate without behavioral disturbance, prior CVA about 10 years ago, and left-sided weakness, multiple focal intracranial stenosis of the arteries, pulmonary nodules suspected to be chronic secondary to her being brought up in [place], prolonged QT interval and deafness with a cochlear implant. The patient has been struggling with memory issues for years since she was in her 45s and is now completely dependent on her family. She has difficulty with short-term memory but can learn with repetition and can get herself dressed toileted and do simple meals. She was recently seen and admitted to [address] [place] [address] for left-sided weakness per her daughter. She was discharged to rehab for her weakness. She did not have a new stroke at that time. But was found to have multiple focal intracranial artery stenosis she was discharged from [facility] rehab last week Wednesday but the weakness is not really improved. She is mostly wheelchair dependent at this time per the daughter. Yesterday the patient's daughter noted that she was yellow which was new for her. She had the home health nurse see the patient as well and they also repeated advised to have the patient checked out. She denies any fevers or chills at home no recent sick contacts. No changes in medications. She denies any nausea or vomiting or diarrhea or burning with pain with micturition although the patient is known to have frequent UTIs.     She does have a family history of Fabry's disease. But the daughter does not note the patient was ever tested    Review of Systems:  10 systems reviewed and negative except as noted in HPI. Assessment & Plan: Active Problems:    Jaundice (9/17/2021)  New  Of unclear etiology  Liver pattern shows predominantly transaminase elevation concerning for intrinsic liver disease; given her history of multiple stenosis ischemia as a possible etiology  We will admit to the medicine inpatient unit  Send hepatitis panel  CT of the abdomen was done in the ER and showed no acute intra-abdominal or pelvic findings  GI consult in the a.m. Coags are pending  We will send urine drug screen  Acetaminophen and alcohol level      Pulmonary nodules (9/17/2021)  Chronic and suspected to be secondary to her living in [place].   She follows with [facility] pulmonology        Dementia/ Major neurocognitive disorder ([facility] (9/17/2021)  Without behavioral disturbances  Continue appropriate home meds per her neurologist      Deafness  Supportive care  Patient communicates by reading lips  And writing on the board        Dispo/Discharge Planning:   TBD    Further work-up and management based on her clinical course  Diet: DIET NPO except for meds until seen by GI  VTE ppx: SCDs  Code status: Full    Hospital Problems as of 9/17/2021 Never Reviewed        Codes Class Noted - Resolved POA    Jaundice ICD-10-CM: R17  ICD-9-CM: 782.4  9/17/2021 - Present Unknown        Pulmonary nodules ICD-10-CM: R91.8  ICD-9-CM: 793.19  9/17/2021 - Present Unknown        Dementia ([facility] ICD-10-CM: F03.90  ICD-9-CM: 294.20  9/17/2021 - Present Unknown        Major neurocognitive disorder ([facility] ICD-10-CM: F01.50  ICD-9-CM: 294.20  9/17/2021 - Present Unknown            Past medical history: As above in HPI    Past surgical history:  Right hip surgery    No Known Allergies   Social History     Tobacco Use    Smoking status:  Non-smoker   Substance Use Topics    Alcohol use:  Nondrinker of alcohol No use of street drugs    Family history asked and is positive for Fabry's disease    Family history reviewed and negative except as noted above. There is no immunization history on file for this patient. PTA Medications:  No current outpatient medications    Objective:     Patient Vitals for the past 24 hrs:   Temp Pulse Resp BP SpO2   09/17/21 2052     95 %   09/17/21 1701 97.7 °F (36.5 °C) 87 16 119/70 95 %     Oxygen Therapy  O2 Sat (%): 95 % (09/17/21 2052)  O2 Device: None (Room air) (09/17/21 2052)    There is no height or weight on file to calculate BMI. Intake/Output Summary (Last 24 hours) at 9/17/2021 2249  Last data filed at 9/17/2021 2052  Gross per 24 hour   Intake 100 ml   Output    Net 100 ml         Physical Exam:    General:    Well nourished. No overt distress. Jaundiced  Head:  Normocephalic, atraumatic  Eyes:  Sclerae appear normal.  Pupils equally round. ENT:  Nares appear normal, no drainage. Moist oral mucosa  Neck:  No restricted ROM. Trachea midline   CV:   RRR. No m/r/g. No jugular venous distension. Lungs:   CTAB. No wheezing, rhonchi, or rales. Respirations even, unlabored  Abdomen: Bowel sounds present. Soft, nontender, distended. Extremities: No cyanosis or clubbing. No edema  Skin:     No rashes and normal coloration. Warm and dry. Neuro:  Cranial nerves II-XII grossly intact. Sensation intact  Psych:  Normal mood and affect.   Alert and oriented x3    I have reviewed ordered lab tests and independently visualized imaging below:    Last 24hr Labs:  Recent Results (from the past 24 hour(s))   BILIRUBIN, DIRECT    Collection Time: 09/17/21  5:03 PM   Result Value Ref Range    Bilirubin, direct 7.4 (H) <0.4 MG/DL   CBC WITH AUTOMATED DIFF    Collection Time: 09/17/21  5:15 PM   Result Value Ref Range    WBC 6.2 4.3 - 11.1 K/uL    RBC 4.40 4.05 - 5.2 M/uL    HGB 12.0 11.7 - 15.4 g/dL    HCT 37.2 35.8 - 46.3 %    MCV 84.5 79.6 - 97.8 FL    MCH 27.3 26.1 - 32.9 PG    MCHC 32.3 31.4 - 35.0 g/dL    RDW 17.4 (H) 11.9 - 14.6 %    PLATELET 665 045 - 249 K/uL    MPV 10.9 9.4 - 12.3 FL    ABSOLUTE NRBC 0.00 0.0 - 0.2 K/uL    DF AUTOMATED      NEUTROPHILS 69 43 - 78 %    LYMPHOCYTES 13 13 - 44 %    MONOCYTES 11 4.0 - 12.0 %    EOSINOPHILS 6 0.5 - 7.8 %    BASOPHILS 1 0.0 - 2.0 %    IMMATURE GRANULOCYTES 1 0.0 - 5.0 %    ABS. NEUTROPHILS 4.3 1.7 - 8.2 K/UL    ABS. LYMPHOCYTES 0.8 0.5 - 4.6 K/UL    ABS. MONOCYTES 0.7 0.1 - 1.3 K/UL    ABS. EOSINOPHILS 0.4 0.0 - 0.8 K/UL    ABS. BASOPHILS 0.0 0.0 - 0.2 K/UL    ABS. IMM. GRANS. 0.0 0.0 - 0.5 K/UL   METABOLIC PANEL, COMPREHENSIVE    Collection Time: 09/17/21  5:15 PM   Result Value Ref Range    Sodium 139 136 - 145 mmol/L    Potassium 3.5 3.5 - 5.1 mmol/L    Chloride 104 98 - 107 mmol/L    CO2 27 21 - 32 mmol/L    Anion gap 8 7 - 16 mmol/L    Glucose 100 65 - 100 mg/dL    BUN 10 6 - 23 MG/DL    Creatinine 0.32 (L) 0.6 - 1.0 MG/DL    GFR est AA >60 >60 ml/min/1.73m2    GFR est non-AA >60 >60 ml/min/1.73m2    Calcium 8.7 8.3 - 10.4 MG/DL    Bilirubin, total 9.1 (H) 0.2 - 1.1 MG/DL    ALT (SGPT) 1,340 (H) 12 - 65 U/L    AST (SGOT) 958 (H) 15 - 37 U/L    Alk.  phosphatase 972 (H) 50 - 136 U/L    Protein, total 7.1 6.3 - 8.2 g/dL    Albumin 2.9 (L) 3.5 - 5.0 g/dL    Globulin 4.2 (H) 2.3 - 3.5 g/dL    A-G Ratio 0.7 (L) 1.2 - 3.5     LIPASE    Collection Time: 09/17/21  5:15 PM   Result Value Ref Range    Lipase 197 73 - 393 U/L   HEPATITIS PANEL, ACUTE    Collection Time: 09/17/21  7:05 PM   Result Value Ref Range    Hepatitis A, IgM NONREACTIVE NR      Hepatitis B core, IgM NONREACTIVE NR      Hep B Surface Ag NONREACTIVE NR      Hepatitis C virus Ab NONREACTIVE NR     PTT    Collection Time: 09/17/21  9:40 PM   Result Value Ref Range    aPTT 34.1 24.1 - 35.1 SEC   PROTHROMBIN TIME + INR    Collection Time: 09/17/21  9:40 PM   Result Value Ref Range    Prothrombin time 13.4 12.6 - 14.5 sec    INR 1.0         All Micro Results     None Other Studies:  CT ABD PELV W CONT    Result Date: 9/17/2021  CT OF THE Abdomen with Contrast and CT of the Pelvis with Contrast 9/17/2021 8:37 PM INDICATION: Acute liver failure COMPARISON: None available at this hospital PACS Technique:  Multiple contiguous axial images encompassing the abdomen and pelvis are obtained following the administration of approximately 100cc of Isovue 370 nonionic intravenous contrast by power injector. Dilute oral contrast is administered. For this CT scanner at least one of the following techniques is utilized to decrease patient radiation dose: Automatic exposure control, KVP and mA modulation based on patient weight, and iterative reconstruction. IV contrast is given to better delineate vascular structures and increase sensitivity of lesions in the solid organs. CT ABDOMEN: Only some slight dependent atelectasis findings suggested at the bases. There is no free air, no significant ascites. Ventral abdominal wall intact. Liver is unremarkable. Gallbladder, biliary tree and pancreas not remarkable. There is not frankly obvious liver cirrhosis changes. Stomach and duodenal sweep not remarkable. Spleen is not significantly enlarged. Adrenals and kidneys not remarkable. Aorta is normal in size. Moderate aortic atherosclerotic calcifications. No acute vascular finding. No enlarged adenopathy. Small bowel loops and colon nondistended with no inflammation or lesion. No acute/aggressive bone lesion. CT PELVIS: Pelvic small bowel loops, the rectosigmoid colon and cecum are nondistended, with no significant inflammation. No acute vascular finding. No enlarged pelvis/groin lymphadenopathy. The bladder is midline; appearance unremarkable. Uterus, adnexa not remarkable for age. No acute/aggressive bone legion. No acute intra-abdominal or pelvis finding identified.          Medications Administered     0.9% sodium chloride infusion     Admin Date  09/17/2021 Action  New Bag Dose  125 mL/hr Rate  125 mL/hr Route  IntraVENous Administered By  Cephus Severs          diatrizoate chloe-diatrizoat sod (MD-GASTROVIEW,GASTROGRAFIN) 66-10 % contrast solution 15 mL     Admin Date  09/17/2021 Action  Given Dose  15 mL Route  Oral Administered By  Cephus Severs          iopamidoL (ISOVUE-370) 76 % injection 100 mL     Admin Date  09/17/2021 Action  Given Dose  100 mL Route  IntraVENous Administered By  Jose, Kathleen N          saline peripheral flush soln 10 mL     Admin Date  09/17/2021 Action  Given Dose  10 mL Route  InterCATHeter Administered By  . [employer] Donkristie N          sodium chloride (NS) flush 5-10 mL     Admin Date  09/17/2021 Action  Given Dose  5 mL Route  IntraVENous Administered By  Cephus Severs          sodium chloride 0.9 % bolus infusion 100 mL     Admin Date  09/17/2021 Action  New Bag Dose  100 mL Route  IntraVENous Administered By  [employer]                  Signed:  Shanthi Workman MD    Part of this note may have been written by using a voice dictation software. The note has been proof read but may still contain some grammatical/other typographical errors.

## 2021-09-18 NOTE — ED NOTES
Patient daughter has left for the evening. Pt daughter would like a call when bed assignment is made. Daughter is also the caregiver that helps patient communicate with medical staff. Pt does have a dry erase board but the patient still relies a lot on reading her daughter's lips. Pt daughter would like to speak to the receiving nurse about a possible special exception made for her to stay with her mother.

## 2021-09-18 NOTE — PROGRESS NOTES
Problem: Falls - Risk of  Goal: *Absence of Falls  Description: Document Martina Brenner Fall Risk and appropriate interventions in the flowsheet. Outcome: Progressing Towards Goal  Note: Fall Risk Interventions:  Mobility Interventions: Bed/chair exit alarm    Mentation Interventions: Bed/chair exit alarm         Elimination Interventions: Bed/chair exit alarm              Problem: Patient Education: Go to Patient Education Activity  Goal: Patient/Family Education  Outcome: Progressing Towards Goal     Problem: Pressure Injury - Risk of  Goal: *Prevention of pressure injury  Description: Document Linus Scale and appropriate interventions in the flowsheet.   Outcome: Progressing Towards Goal  Note: Pressure Injury Interventions:       Moisture Interventions: Absorbent underpads    Activity Interventions: Pressure redistribution bed/mattress(bed type)         Nutrition Interventions: Document food/fluid/supplement intake    Friction and Shear Interventions: Apply protective barrier, creams and emollients                Problem: Patient Education: Go to Patient Education Activity  Goal: Patient/Family Education  Outcome: Progressing Towards Goal

## 2021-09-18 NOTE — ED NOTES
TRANSFER - OUT REPORT:    Verbal report given to [place] RN(name) on GUSTAVO  being transferred to 7th floor(unit) for routine progression of care       Report consisted of patients Situation, Background, Assessment and   Recommendations(SBAR). Information from the following report(s) SBAR was reviewed with the receiving nurse. Lines:   Peripheral IV 09/17/21 Left Antecubital (Active)   Site Assessment Clean, dry, & intact 09/17/21 1702   Phlebitis Assessment 0 09/17/21 1702   Infiltration Assessment 0 09/17/21 1702   Dressing Status Clean, dry, & intact 09/17/21 1702   Dressing Type 4 X 4 09/17/21 1702   Hub Color/Line Status Blue 09/17/21 1702        Opportunity for questions and clarification was provided.       Patient transported with:   Ketto

## 2021-09-18 NOTE — PROGRESS NOTES
TRANSFER - IN REPORT:    Verbal report received from Ashlie Mckeon RN(name) on GUSTAVO  being received from ED(unit) for routine progression of care      Report consisted of patients Situation, Background, Assessment and   Recommendations(SBAR). Information from the following report(s) SBAR was reviewed with the receiving nurse. Opportunity for questions and clarification was provided. Assessment completed upon patients arrival to unit and care assumed.

## 2021-09-19 LAB
ALBUMIN SERPL-MCNC: 2 G/DL (ref 3.5–5)
ALBUMIN/GLOB SERPL: 0.6 {RATIO} (ref 1.2–3.5)
ALP SERPL-CCNC: 752 U/L (ref 50–136)
ALT SERPL-CCNC: 817 U/L (ref 12–65)
ANION GAP SERPL CALC-SCNC: 7 MMOL/L (ref 7–16)
AST SERPL-CCNC: 573 U/L (ref 15–37)
BILIRUB SERPL-MCNC: 8.2 MG/DL (ref 0.2–1.1)
BUN SERPL-MCNC: 5 MG/DL (ref 6–23)
CALCIUM SERPL-MCNC: 8.4 MG/DL (ref 8.3–10.4)
CHLORIDE SERPL-SCNC: 115 MMOL/L (ref 98–107)
CO2 SERPL-SCNC: 24 MMOL/L (ref 21–32)
CREAT SERPL-MCNC: 0.23 MG/DL (ref 0.6–1)
FERRITIN SERPL-MCNC: 580 NG/ML (ref 8–388)
GLOBULIN SER CALC-MCNC: 3.3 G/DL (ref 2.3–3.5)
GLUCOSE SERPL-MCNC: 75 MG/DL (ref 65–100)
INR PPP: 1
IRON SATN MFR SERPL: 50 %
IRON SERPL-MCNC: 125 UG/DL (ref 35–150)
POTASSIUM SERPL-SCNC: 3.3 MMOL/L (ref 3.5–5.1)
PROT SERPL-MCNC: 5.3 G/DL (ref 6.3–8.2)
PROTHROMBIN TIME: 13.5 SEC (ref 12.6–14.5)
SODIUM SERPL-SCNC: 146 MMOL/L (ref 136–145)
TIBC SERPL-MCNC: 251 UG/DL (ref 250–450)

## 2021-09-19 PROCEDURE — 74011250636 HC RX REV CODE- 250/636: Performed by: EMERGENCY MEDICINE

## 2021-09-19 PROCEDURE — 83540 ASSAY OF IRON: CPT

## 2021-09-19 PROCEDURE — 80053 COMPREHEN METABOLIC PANEL: CPT

## 2021-09-19 PROCEDURE — 82728 ASSAY OF FERRITIN: CPT

## 2021-09-19 PROCEDURE — 85610 PROTHROMBIN TIME: CPT

## 2021-09-19 PROCEDURE — 36415 COLL VENOUS BLD VENIPUNCTURE: CPT

## 2021-09-19 PROCEDURE — 65270000029 HC RM PRIVATE

## 2021-09-19 PROCEDURE — 77030040393 HC DRSG OPTIFOAM GENT MDII -B

## 2021-09-19 PROCEDURE — 2709999900 HC NON-CHARGEABLE SUPPLY

## 2021-09-19 RX ADMIN — Medication 10 ML: at 06:14

## 2021-09-19 RX ADMIN — Medication 10 ML: at 16:37

## 2021-09-19 RX ADMIN — SODIUM CHLORIDE 125 ML/HR: 900 INJECTION, SOLUTION INTRAVENOUS at 17:37

## 2021-09-19 RX ADMIN — Medication 10 ML: at 23:15

## 2021-09-19 NOTE — PROGRESS NOTES
Progress Note    Patient: Sadi Allen MRN: [id number]  SSN: [id number]    YOB: 1960  Age: 62 y.o. Sex: female      Admit Date: 9/17/2021    LOS: 2 days     Assessment and Plan:   62 y.o. female with a past medical history significant for neurocognitive disorder, prior CVA, that presented in the setting of jaundice    1. Nonobstructive jaundice / Transaminitis / Hyperbilirubinemia  -Family history of Fabry disease  -No signs of hemolysis  -CT of the abdomen without acute intra-abdominal pathology  -Negative viral hepatitis panel  -Negative acetaminophen level  -Abdominal Doppler ultrasound without evidence of thrombosis   -Follow NELY, ASMA, AMA  -Monitor LFTs  -Gastroenterology recommendations appreciated    2. Pulmonary nodules  -Chronic  -Outpatient follow-up    3. Neurocognitive disorder  -No behavioral disturbances  -Monitor    DVT prophylaxis with SCDs      Subjective:   62 y.o. female with a past medical history significant for neurocognitive disorder, prior CVA, that presented in the setting of jaundice. Patient seen and examined at bedside. This morning the patient still tired. Otherwise denies any chest pain, no abdominal pain, no nausea vomiting or diarrhea. Objective:     Vitals:    09/18/21 2352 09/19/21 0317 09/19/21 0820 09/19/21 1200   BP: 107/60 117/62 112/62 113/66   Pulse: 77 74 75 80   Resp: 16 16 18 18   Temp: 98.9 °F (37.2 °C) 98.9 °F (37.2 °C) 97.9 °F (36.6 °C) 98.5 °F (36.9 °C)   SpO2: 95% 96% 100% 96%        Intake and Output:  Current Shift: No intake/output data recorded.   Last three shifts: 09/17 1901 - 09/19 0700  In: 100 [I.V.:100]  Out: -     ROS  10 ROS negative except from stated on subjective    Physical Exam:   General: Alert, NAD  HEENT: NC/AT, EOM are intact  Neck: supple, no JVD  Cardiovascular: RRR, S1, S2, no murmurs  Respiratory: Lungs are clear, no wheezes or rales  Abdomen: Soft, NT, ND  Back: No CVA tenderness, no paraspinal tenderness  Extremities: LE without pedal edema, no erythema  Neuro: CN are intact, no focal deficits  Skin: no rash or ulcers  Psych: good mood and affect    Lab/Data Review:  I have personally reviewed patients laboratory data showing  Recent Results (from the past 24 hour(s))   TRANSFERRIN SATURATION    Collection Time: 09/19/21  4:59 AM   Result Value Ref Range    Iron 125 35 - 150 ug/dL    TIBC 251 250 - 450 ug/dL    Transferrin Saturation 50 >20 %   FERRITIN    Collection Time: 09/19/21  4:59 AM   Result Value Ref Range    Ferritin 580 (H) 8 - 117 NG/ML   METABOLIC PANEL, COMPREHENSIVE    Collection Time: 09/19/21  5:00 AM   Result Value Ref Range    Sodium 146 (H) 136 - 145 mmol/L    Potassium 3.3 (L) 3.5 - 5.1 mmol/L    Chloride 115 (H) 98 - 107 mmol/L    CO2 24 21 - 32 mmol/L    Anion gap 7 7 - 16 mmol/L    Glucose 75 65 - 100 mg/dL    BUN 5 (L) 6 - 23 MG/DL    Creatinine 0.23 (L) 0.6 - 1.0 MG/DL    GFR est AA >60 >60 ml/min/1.73m2    GFR est non-AA >60 >60 ml/min/1.73m2    Calcium 8.4 8.3 - 10.4 MG/DL    Bilirubin, total 8.2 (H) 0.2 - 1.1 MG/DL    ALT (SGPT) 817 (H) 12 - 65 U/L    AST (SGOT) 573 (H) 15 - 37 U/L    Alk. phosphatase 752 (H) 50 - 136 U/L    Protein, total 5.3 (L) 6.3 - 8.2 g/dL    Albumin 2.0 (L) 3.5 - 5.0 g/dL    Globulin 3.3 2.3 - 3.5 g/dL    A-G Ratio 0.6 (L) 1.2 - 3.5     PROTHROMBIN TIME + INR    Collection Time: 09/19/21  5:00 AM   Result Value Ref Range    Prothrombin time 13.5 12.6 - 14.5 sec    INR 1.0        All Micro Results     None           Image:  I have personally reviewed patients imaging showing  DUPLEX ABD VISC ART/EMILIANA/ORGANS LIMITED (64111)   Final Result   Elevated velocities in the celiac artery and superior mesenteric   artery consistent with stenoses. Extensive atherosclerotic disease throughout   the abdominal aorta. The recent CT scan has similar findings. CT ABD PELV W CONT   Final Result   No acute intra-abdominal or pelvis finding identified. Hospital problems     Patient Active Problem List   Diagnosis Code    Jaundice R17    Pulmonary nodules R91.8    Dementia (HCC) F03.90    Major neurocognitive disorder ([facility] F01.50    Transaminitis R74.01    Hyperbilirubinemia E80.6        I have reviewed, updated, and verified this note's content and spent 36 minutes of my 40 minutes visit performing counseling and coordination of care regarding medical management.        Signed By: Cyndi Cockayne, MD     September 19, 2021

## 2021-09-19 NOTE — PROGRESS NOTES
Problem: Falls - Risk of  Goal: *Absence of Falls  Description: Document Claudia Jean Fall Risk and appropriate interventions in the flowsheet. Outcome: Progressing Towards Goal  Note: Fall Risk Interventions:  Mobility Interventions: OT consult for ADLs, Patient to call before getting OOB, PT Consult for mobility concerns    Mentation Interventions: Bed/chair exit alarm, Door open when patient unattended         Elimination Interventions: Bed/chair exit alarm, Call light in reach, Patient to call for help with toileting needs, Toileting schedule/hourly rounds              Problem: Patient Education: Go to Patient Education Activity  Goal: Patient/Family Education  Outcome: Progressing Towards Goal     Problem: Pressure Injury - Risk of  Goal: *Prevention of pressure injury  Description: Document Linus Scale and appropriate interventions in the flowsheet.   Outcome: Progressing Towards Goal  Note: Pressure Injury Interventions:       Moisture Interventions: Absorbent underpads, Check for incontinence Q2 hours and as needed    Activity Interventions: Increase time out of bed, PT/OT evaluation         Nutrition Interventions: Document food/fluid/supplement intake, Discuss nutritional consult with provider, Offer support with meals,snacks and hydration    Friction and Shear Interventions: HOB 30 degrees or less, Transferring/repositioning devices                Problem: Patient Education: Go to Patient Education Activity  Goal: Patient/Family Education  Outcome: Progressing Towards Goal

## 2021-09-19 NOTE — PROGRESS NOTES
[facility] Progress Note         Admit Date:  9/17/2021    Today's Date:  9/19/2021    CC:  Elevated liver chemistries    Subjective:     No acute events overnight. Mild nausea earlier this AM, which resolved spontaneously. Good appetite. Otherwise denies abd pain, vomiting, diarrhea, constipation. Medications:   Current Facility-Administered Medications   Medication Dose Route Frequency    sodium chloride (NS) flush 5-10 mL  5-10 mL IntraVENous Q8H    sodium chloride (NS) flush 5-10 mL  5-10 mL IntraVENous PRN    0.9% sodium chloride infusion  125 mL/hr IntraVENous CONTINUOUS    sodium chloride (NS) flush 5-40 mL  5-40 mL IntraVENous Q8H    sodium chloride (NS) flush 5-40 mL  5-40 mL IntraVENous PRN    polyethylene glycol (MIRALAX) packet 17 g  17 g Oral DAILY PRN    ondansetron (ZOFRAN ODT) tablet 4 mg  4 mg Oral Q8H PRN    Or    ondansetron (ZOFRAN) injection 4 mg  4 mg IntraVENous Q6H PRN       Review of Systems:  ROS was obtained, with pertinent positives as listed above. No chest pain or SOB. Diet:      Objective:   Vitals:  Visit Vitals  /62 (BP 1 Location: Left arm)   Pulse 75   Temp 97.9 °F (36.6 °C)   Resp 18   SpO2 100%     Intake/Output:  No intake/output data recorded.   09/17 1901 - 09/19 0700  In: 100 [I.V.:100]  Out: -     Exam:  GENERAL: Alert, cooperative, in no acute distress, jaundiced; difficulty hearing but able to verbalize  CV: Regular rate and rhythm  RESP: Clear to auscultation bilaterally anteriorly  ABD: Soft, non-tender, non-distended, normoactive bowel sounds, no organomegaly appreciated  EXTREM: Extremities normal, atraumatic, no cyanosis or edema  NEURO: Awake and alert    Data Review (Labs):    Recent Labs     09/19/21  0500 09/18/21  0548 09/17/21  2140 09/17/21  1715 09/17/21  1703   WBC  --   --   --  6.2  --    HGB  --   --   --  12.0  --    HCT  --   --   --  37.2  --    PLT  --   --   --  304  --    MCV  --   --   --  84.5  --    * 146*  --  139  --    K 3.3* 3.2*  --  3.5  --    * 111*  --  104  --    CO2 24 26  --  27  --    BUN 5* 7  --  10  --    CREA 0.23* 0.23*  --  0.32*  --    CA 8.4 8.4  --  8.7  --    GLU 75 75  --  100  --    * 772*  --  972*  --    * 1,040*  --  1,340*  --    TBILI 8.2* 8.5*  --  9.1*  --    CBIL  --   --   --   --  7.4*   ALB 2.0* 2.2*  --  2.9*  --    TP 5.3* 5.8*  --  7.1  --    LPSE  --   --   --  197  --    PTP 13.5  --  13.4  --   --    INR 1.0  --  1.0  --   --    APTT  --   --  34.1  --   --      CT A/P W CONTRAST 9/17/21  CT ABDOMEN: Only some slight dependent atelectasis findings suggested at the  bases. There is no free air, no significant ascites. Ventral abdominal wall intact. Liver is unremarkable. Gallbladder, biliary tree and pancreas not remarkable. There is not frankly  obvious liver cirrhosis changes. Stomach and duodenal sweep not remarkable. Spleen is not significantly enlarged. Adrenals and kidneys not remarkable. Aorta is normal in size. Moderate aortic atherosclerotic calcifications. No  acute vascular finding. No enlarged adenopathy. Small bowel loops and colon nondistended with no inflammation or lesion. No acute/aggressive bone lesion.   CT PELVIS: Pelvic small bowel loops, the rectosigmoid colon and cecum are  nondistended, with no significant inflammation. No acute vascular finding.  No enlarged pelvis/groin lymphadenopathy. The bladder is midline; appearance unremarkable. Uterus, adnexa not remarkable for age. No acute/aggressive bone legion.   IMPRESSION  No acute intra-abdominal or pelvis finding identified. Abdominal doppler 9/18/21:  IMPRESSION  Elevated velocities in the celiac artery and superior mesenteric  artery consistent with stenoses. Extensive atherosclerotic disease throughout  the abdominal aorta. The recent CT scan has similar findings.       Assessment:     Principal Problem:    Transaminitis (9/18/2021)    Active Problems: Jaundice (9/17/2021)      Pulmonary nodules (9/17/2021)      Dementia ([facility] [address] (9/17/2021)      Major neurocognitive disorder ([facility] [address] (9/17/2021)      Hyperbilirubinemia (9/18/2021)      Patient is a 62 y.o. female with PMH CVA, memory loss with intracranial stenosis, pulmonary nodules, deafness, former tobacco abuse who is seen in consultation for painless jaundice. She presented to the ED 9/17 for the same, accompanied by her daughter who noted onset of jaundice that day. Found to have elevated liver chemistries in mixed pattern. Otherwise asymptomatic. Viral hepatitis panel, UDS, tylenol level all negative. CT A/P with no acute abnormality to account for symptoms. Not on any obvious offending meds and no reported hypotension. Denies IVDA, tylenol, herbals, EtOH use. She has a family history of Fabry disease, although she does not have any typical manifestations of the disease, which also does not typically affect the liver. Pattern of liver chemistry elevation is mixed but may be related to cholestasis, medication-induced, or even possibly auto-immune. It does not appear to be obstructive. 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 Autoimmune serologies pending.     Plan:     - LFTs downtrending, monitor daily CMP and INR  - Follow up NELY, AMA, ASMA, iron profile  - If the aforementioned workup is unremarkable, consider expanding viral workup +/- eventual liver biopsy  - Will follow along      José Ernst MD  [facility]

## 2021-09-19 NOTE — PROGRESS NOTES
Problem: Falls - Risk of  Goal: *Absence of Falls  Description: Document [place] Fall Risk and appropriate interventions in the flowsheet. Outcome: Progressing Towards Goal  Note: Fall Risk Interventions:  Mobility Interventions: Bed/chair exit alarm, Communicate number of staff needed for ambulation/transfer    Mentation Interventions: Bed/chair exit alarm, Door open when patient unattended, More frequent rounding, Reorient patient         Elimination Interventions: Bed/chair exit alarm, Call light in reach              Problem: Patient Education: Go to Patient Education Activity  Goal: Patient/Family Education  Outcome: Progressing Towards Goal     Problem: Pressure Injury - Risk of  Goal: *Prevention of pressure injury  Description: Document [place] Scale and appropriate interventions in the flowsheet.   Outcome: Progressing Towards Goal  Note: Pressure Injury Interventions:       Moisture Interventions: Check for incontinence Q2 hours and as needed    Activity Interventions: PT/OT evaluation         Nutrition Interventions: Document food/fluid/supplement intake, Offer support with meals,snacks and hydration    Friction and Shear Interventions: HOB 30 degrees or less, Transferring/repositioning devices

## 2021-09-20 LAB
ALBUMIN SERPL-MCNC: 2 G/DL (ref 3.5–5)
ALBUMIN/GLOB SERPL: 0.6 {RATIO} (ref 1.2–3.5)
ALP SERPL-CCNC: 725 U/L (ref 50–136)
ALT SERPL-CCNC: 695 U/L (ref 12–65)
ANION GAP SERPL CALC-SCNC: 8 MMOL/L (ref 7–16)
AST SERPL-CCNC: 528 U/L (ref 15–37)
BASOPHILS # BLD: 0 K/UL (ref 0–0.2)
BASOPHILS NFR BLD: 1 % (ref 0–2)
BILIRUB SERPL-MCNC: 9.2 MG/DL (ref 0.2–1.1)
BUN SERPL-MCNC: 4 MG/DL (ref 6–23)
CALCIUM SERPL-MCNC: 8.5 MG/DL (ref 8.3–10.4)
CHLORIDE SERPL-SCNC: 115 MMOL/L (ref 98–107)
CO2 SERPL-SCNC: 24 MMOL/L (ref 21–32)
CREAT SERPL-MCNC: 0.23 MG/DL (ref 0.6–1)
DIFFERENTIAL METHOD BLD: ABNORMAL
EOSINOPHIL # BLD: 0.4 K/UL (ref 0–0.8)
EOSINOPHIL NFR BLD: 9 % (ref 0.5–7.8)
ERYTHROCYTE [DISTWIDTH] IN BLOOD BY AUTOMATED COUNT: 18.5 % (ref 11.9–14.6)
GLOBULIN SER CALC-MCNC: 3.4 G/DL (ref 2.3–3.5)
GLUCOSE SERPL-MCNC: 73 MG/DL (ref 65–100)
HCT VFR BLD AUTO: 30.7 % (ref 35.8–46.3)
HGB BLD-MCNC: 9.8 G/DL (ref 11.7–15.4)
IMM GRANULOCYTES # BLD AUTO: 0 K/UL (ref 0–0.5)
IMM GRANULOCYTES NFR BLD AUTO: 0 % (ref 0–5)
LYMPHOCYTES # BLD: 1.4 K/UL (ref 0.5–4.6)
LYMPHOCYTES NFR BLD: 31 % (ref 13–44)
MCH RBC QN AUTO: 26.7 PG (ref 26.1–32.9)
MCHC RBC AUTO-ENTMCNC: 31.9 G/DL (ref 31.4–35)
MCV RBC AUTO: 83.7 FL (ref 79.6–97.8)
MONOCYTES # BLD: 0.5 K/UL (ref 0.1–1.3)
MONOCYTES NFR BLD: 12 % (ref 4–12)
NEUTS SEG # BLD: 2.2 K/UL (ref 1.7–8.2)
NEUTS SEG NFR BLD: 48 % (ref 43–78)
NRBC # BLD: 0 K/UL (ref 0–0.2)
PLATELET # BLD AUTO: 265 K/UL (ref 150–450)
PMV BLD AUTO: 11.1 FL (ref 9.4–12.3)
POTASSIUM SERPL-SCNC: 3.3 MMOL/L (ref 3.5–5.1)
PROT SERPL-MCNC: 5.4 G/DL (ref 6.3–8.2)
RBC # BLD AUTO: 3.67 M/UL (ref 4.05–5.2)
SODIUM SERPL-SCNC: 147 MMOL/L (ref 136–145)
WBC # BLD AUTO: 4.6 K/UL (ref 4.3–11.1)

## 2021-09-20 PROCEDURE — 74011250636 HC RX REV CODE- 250/636: Performed by: INTERNAL MEDICINE

## 2021-09-20 PROCEDURE — 80053 COMPREHEN METABOLIC PANEL: CPT

## 2021-09-20 PROCEDURE — 65270000029 HC RM PRIVATE

## 2021-09-20 PROCEDURE — 87798 DETECT AGENT NOS DNA AMP: CPT

## 2021-09-20 PROCEDURE — 2709999900 HC NON-CHARGEABLE SUPPLY

## 2021-09-20 PROCEDURE — 36415 COLL VENOUS BLD VENIPUNCTURE: CPT

## 2021-09-20 PROCEDURE — 85025 COMPLETE CBC W/AUTO DIFF WBC: CPT

## 2021-09-20 RX ORDER — SODIUM CHLORIDE, SODIUM LACTATE, POTASSIUM CHLORIDE, CALCIUM CHLORIDE 600; 310; 30; 20 MG/100ML; MG/100ML; MG/100ML; MG/100ML
50 INJECTION, SOLUTION INTRAVENOUS CONTINUOUS
Status: DISCONTINUED | OUTPATIENT
Start: 2021-09-20 | End: 2021-09-22 | Stop reason: HOSPADM

## 2021-09-20 RX ADMIN — Medication 10 ML: at 21:31

## 2021-09-20 RX ADMIN — SODIUM CHLORIDE, SODIUM LACTATE, POTASSIUM CHLORIDE, AND CALCIUM CHLORIDE 50 ML/HR: 600; 310; 30; 20 INJECTION, SOLUTION INTRAVENOUS at 12:32

## 2021-09-20 RX ADMIN — Medication 10 ML: at 06:07

## 2021-09-20 NOTE — PROGRESS NOTES
Follow-up with unit staff to explore patient needs. Chaplains remain available for care.      Dennie Dyke, [address]  Board Certified

## 2021-09-20 NOTE — WOUND CARE
Patient seen for moisture associated skin damage due to incontinence. She has a 1.5 cm shallow pink ulceration in gluteal cleft fold. Cleaned and applied zinc paste in thick layer. Used white board to communicate as patient is very hard of hearing and mask was worn so she could not read lips during this consult. Answered all questions at bedside. Wound team will follow loosely, please call if needed further.

## 2021-09-20 NOTE — PROGRESS NOTES
Problem: Falls - Risk of  Goal: *Absence of Falls  Description: Document [place] Flow Fall Risk and appropriate interventions in the flowsheet.   Outcome: Progressing Towards Goal  Note: Fall Risk Interventions:  Mobility Interventions: Bed/chair exit alarm, OT consult for ADLs, Patient to call before getting OOB, PT Consult for mobility concerns    Mentation Interventions: Bed/chair exit alarm, Door open when patient unattended         Elimination Interventions: Bed/chair exit alarm, Call light in reach, Patient to call for help with toileting needs, Toileting schedule/hourly rounds

## 2021-09-20 NOTE — PROGRESS NOTES
[facility] Progress Note         Admit Date:  9/17/2021  Today's Date:  9/20/2021    CC:  Elevated liver chemistries    Subjective:     Communication was done with witting on white board. Patient denies N/V or abdominal pain. Medications:   Current Facility-Administered Medications   Medication Dose Route Frequency    lactated Ringers infusion  50 mL/hr IntraVENous CONTINUOUS    sodium chloride (NS) flush 5-10 mL  5-10 mL IntraVENous Q8H    sodium chloride (NS) flush 5-10 mL  5-10 mL IntraVENous PRN    sodium chloride (NS) flush 5-40 mL  5-40 mL IntraVENous Q8H    sodium chloride (NS) flush 5-40 mL  5-40 mL IntraVENous PRN    polyethylene glycol (MIRALAX) packet 17 g  17 g Oral DAILY PRN    ondansetron (ZOFRAN ODT) tablet 4 mg  4 mg Oral Q8H PRN    Or    ondansetron (ZOFRAN) injection 4 mg  4 mg IntraVENous Q6H PRN       Review of Systems:  ROS was obtained, with pertinent positives as listed above. No chest pain or SOB. Diet:  Regular    Objective:   Vitals:  Visit Vitals  /72 (BP 1 Location: Left arm, BP Patient Position: At rest)   Pulse 60   Temp 97.8 °F (36.6 °C)   Resp 18   SpO2 98%     Intake/Output:  No intake/output data recorded. No intake/output data recorded. Exam:  GENERAL: Alert, cooperative, in NAD.  Jaundiced; difficulty hearing but able to verbalize  CV: RRR  RESP: CTA ant  ABD: Soft, non-tender, non-distended, NABS, no organomegaly appreciated  EXTREM: Extremities normal, atraumatic, no cyanosis or edema  NEURO: Awake and alert    Data Review (Labs):    Recent Labs     09/20/21  0439 09/19/21  0500 09/18/21  0548 09/17/21  2140 09/17/21  1715 09/17/21  1703   WBC 4.6  --   --   --  6.2  --    HGB 9.8*  --   --   --  12.0  --    HCT 30.7*  --   --   --  37.2  --      --   --   --  304  --    MCV 83.7  --   --   --  84.5  --    * 146* 146*  --  139  --    K 3.3* 3.3* 3.2*  --  3.5  --    * 115* 111*  --  104  --    CO2 24 24 26  --  27 --    BUN 4* 5* 7  --  10  --    CREA 0.23* 0.23* 0.23*  --  0.32*  --    CA 8.5 8.4 8.4  --  8.7  --    GLU 73 75 75  --  100  --    * 752* 772*  --  972*  --    * 817* 1,040*  --  1,340*  --    TBILI 9.2* 8.2* 8.5*  --  9.1*  --    CBIL  --   --   --   --   --  7.4*   ALB 2.0* 2.0* 2.2*  --  2.9*  --    TP 5.4* 5.3* 5.8*  --  7.1  --    LPSE  --   --   --   --  197  --    PTP  --  13.5  --  13.4  --   --    INR  --  1.0  --  1.0  --   --    APTT  --   --   --  34.1  --   --      CT A/P W CONTRAST 9/17/21  CT ABDOMEN: Only some slight dependent atelectasis findings suggested at the  bases. There is no free air, no significant ascites. Ventral abdominal wall intact. Liver is unremarkable. Gallbladder, biliary tree and pancreas not remarkable. There is not frankly  obvious liver cirrhosis changes. Stomach and duodenal sweep not remarkable. Spleen is not significantly enlarged. Adrenals and kidneys not remarkable. Aorta is normal in size. Moderate aortic atherosclerotic calcifications. No  acute vascular finding. No enlarged adenopathy. Small bowel loops and colon nondistended with no inflammation or lesion. No acute/aggressive bone lesion.   CT PELVIS: Pelvic small bowel loops, the rectosigmoid colon and cecum are  nondistended, with no significant inflammation. No acute vascular finding.  No enlarged pelvis/groin lymphadenopathy. The bladder is midline; appearance unremarkable. Uterus, adnexa not remarkable for age. No acute/aggressive bone legion.   IMPRESSION  No acute intra-abdominal or pelvis finding identified. Abdominal doppler 9/18/21:  IMPRESSION  Elevated velocities in the celiac artery and superior mesenteric  artery consistent with stenoses. Extensive atherosclerotic disease throughout  the abdominal aorta. The recent CT scan has similar findings.       Assessment:     Principal Problem:  Transaminitis (9/18/2021)    Active Problems:  Jaundice (9/17/2021)  Pulmonary nodules (9/17/2021)  Dementia ([facility] [address] (9/17/2021)  Major neurocognitive disorder ([facility] [address] (9/17/2021)  Hyperbilirubinemia (9/18/2021)    62 y.o. female with PMH CVA, memory loss with intracranial stenosis, pulmonary nodules, deafness, former tobacco abuse who is seen in consultation for painless jaundice. She presented to the ED 9/17 for the same, accompanied by her daughter who noted onset of jaundice that day. Found to have elevated liver chemistries in mixed pattern. Otherwise asymptomatic. Viral hepatitis panel, UDS, tylenol level all negative. CT A/P with no acute abnormality to account for symptoms. Not on any obvious offending meds and no reported hypotension. Denies IVDA, tylenol, herbals, EtOH use. She has a family history of Fabry disease, although she does not have any typical manifestations of the disease, which also does not typically affect the liver. Pattern of liver chemistry elevation is mixed but may be related to cholestasis, medication-induced, or even possibly auto-immune. It does not appear to be obstructive. 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 Autoimmune serologies pending. Plan:     - Transaminases down trending. INR remains at 1.0.   - Follow up NELY, AMA, ASMA. - If the aforementioned workup is unremarkable, consider expanding viral workup +/- eventual liver biopsy      Don Duffy PA-C  [facility], [facility]--addendum--patient seen and examined. Agree with above. Patient asymptomatic with a stable protime and decreasing transaminases. Will check EB and CMV viral titers while awaiting autoimmune studies.   Thanks Evonne Clancy MD

## 2021-09-20 NOTE — MED STUDENT NOTES
*ATTENTION:  This note has been created by a medical student for educational purposes only. Please do not refer to the content of this note for clinical decision-making, billing, or other purposes. Please see attending physicians note to obtain clinical information on this patient. *              [employer] Hospitalist Progress Note     Name:  Kleber Barrow  Age:60 y.o. Sex:female   :  1960    MRN:  [id number]     Admit Date:  2021    Reason for Admission:  Jaundice [R17]  Major neurocognitive disorder ([facility] [address] [F01.50]  Dementia ([facility] [address] [F03.90]  Pulmonary nodules [R91.8]    Assessment & Plan   (1.) nonobstructive jaundice/ transaminitis/ hyperbilirubinemia  -no signs of hemolysis  -CT of ABD without acute intra-abdominal pathology  -negative viral hep. Panel  -negative acetaminophen level  -ABD doppler U/S without evidence of thrombosis  -follow NELY, ASMA, AMA  -monitor LFTs, has been trending down  -GI following    (2.) pulmonary nodules  -chronic, stable  -continue outpatient follow-up    (3.) neurocognitive disorder  -no behavioral disturbances  -monitor      Diet:  DIET ADULT  DVT PPx: SCDs  Code status: Full Code  Disposition/Expected LOS: 2 days      Subjective (21):  62year old female with no significant medical history presents with generalized abdominal pain on  and was admitted for jaundice. Patient has prior CVA about 10 years ago affecting left side. Family history is positive for Fabry's disease but patient was never tested. Patient denies any symptoms of fever, chills, SOB, cough, nausea, vomiting, or diarrhea.     21: Patient was comfortably watching tv. Still jaundice but without complaints. She states that she is feeling great. Patient denies chest pain, SOB, abdominal pain, NVD, or constipation. Review of Systems: 14 point review of systems is otherwise negative with the exception of the elements mentioned above.     Objective:     Patient Vitals for the past 24 hrs:   Temp Pulse Resp BP SpO2   09/20/21 0706 97.5 °F (36.4 °C) 69 18 125/70 93 %   09/20/21 0449 98.3 °F (36.8 °C) 66 18 (!) 129/58 93 %   09/19/21 2351 98.6 °F (37 °C) 72 18 127/63 95 %   09/19/21 2051 98.5 °F (36.9 °C) 75 20 133/70 93 %   09/19/21 1600 98.6 °F (37 °C) 96 18 124/61 96 %   09/19/21 1200 98.5 °F (36.9 °C) 80 18 113/66 96 %     Oxygen Therapy  O2 Sat (%): 93 % (09/20/21 0706)  O2 Device: None (Room air) (09/18/21 2038)    There is no height or weight on file to calculate BMI. Physical Exam:   General:  WDWN female in no acute distress, speaking in full sentences, no use of accessory muscles. Hard of hearing. HEENT: Sclera is icteric. NCAT. Neck is supple without lymphadenopathy. Lungs:  Clear to auscultation bilaterally. No wheezing, rales, or rhonchi. CV:  Regular rate and rhythm with normal S1 and S2. No m/r/g. Abdomen:  Soft, nontender, nondistended, normoactive bowel sounds. Extremities:  No cyanosis clubbing or edema. Neuro:  Nonfocal, A&O x3. Grossly intact. Read and answered all questions appropriately. Skin:   With jaundice but no rash or lesions noted. Psych:  Normal affect and mood. Data Review:  I have reviewed all labs, meds, and studies from the last 24 hours:    Labs:    Recent Results (from the past 24 hour(s))   METABOLIC PANEL, COMPREHENSIVE    Collection Time: 09/20/21  4:39 AM   Result Value Ref Range    Sodium 147 (H) 136 - 145 mmol/L    Potassium 3.3 (L) 3.5 - 5.1 mmol/L    Chloride 115 (H) 98 - 107 mmol/L    CO2 24 21 - 32 mmol/L    Anion gap 8 7 - 16 mmol/L    Glucose 73 65 - 100 mg/dL    BUN 4 (L) 6 - 23 MG/DL    Creatinine 0.23 (L) 0.6 - 1.0 MG/DL    GFR est AA >60 >60 ml/min/1.73m2    GFR est non-AA >60 >60 ml/min/1.73m2    Calcium 8.5 8.3 - 10.4 MG/DL    Bilirubin, total 9.2 (H) 0.2 - 1.1 MG/DL    ALT (SGPT) 695 (H) 12 - 65 U/L    AST (SGOT) 528 (H) 15 - 37 U/L    Alk.  phosphatase 725 (H) 50 - 136 U/L    Protein, total 5.4 (L) 6.3 - 8.2 g/dL    Albumin 2.0 (L) 3.5 - 5.0 g/dL    Globulin 3.4 2.3 - 3.5 g/dL    A-G Ratio 0.6 (L) 1.2 - 3.5     CBC WITH AUTOMATED DIFF    Collection Time: 09/20/21  4:39 AM   Result Value Ref Range    WBC 4.6 4.3 - 11.1 K/uL    RBC 3.67 (L) 4.05 - 5.2 M/uL    HGB 9.8 (L) 11.7 - 15.4 g/dL    HCT 30.7 (L) 35.8 - 46.3 %    MCV 83.7 79.6 - 97.8 FL    MCH 26.7 26.1 - 32.9 PG    MCHC 31.9 31.4 - 35.0 g/dL    RDW 18.5 (H) 11.9 - 14.6 %    PLATELET 220 364 - 006 K/uL    MPV 11.1 9.4 - 12.3 FL    ABSOLUTE NRBC 0.00 0.0 - 0.2 K/uL    DF AUTOMATED      NEUTROPHILS 48 43 - 78 %    LYMPHOCYTES 31 13 - 44 %    MONOCYTES 12 4.0 - 12.0 %    EOSINOPHILS 9 (H) 0.5 - 7.8 %    BASOPHILS 1 0.0 - 2.0 %    IMMATURE GRANULOCYTES 0 0.0 - 5.0 %    ABS. NEUTROPHILS 2.2 1.7 - 8.2 K/UL    ABS. LYMPHOCYTES 1.4 0.5 - 4.6 K/UL    ABS. MONOCYTES 0.5 0.1 - 1.3 K/UL    ABS. EOSINOPHILS 0.4 0.0 - 0.8 K/UL    ABS. BASOPHILS 0.0 0.0 - 0.2 K/UL    ABS. IMM. GRANS. 0.0 0.0 - 0.5 K/UL       All Micro Results     None          EKG Results     None          Other Studies:  No results found.     Current Meds:   Current Facility-Administered Medications   Medication Dose Route Frequency    sodium chloride (NS) flush 5-10 mL  5-10 mL IntraVENous Q8H    sodium chloride (NS) flush 5-10 mL  5-10 mL IntraVENous PRN    0.9% sodium chloride infusion  50 mL/hr IntraVENous CONTINUOUS    sodium chloride (NS) flush 5-40 mL  5-40 mL IntraVENous Q8H    sodium chloride (NS) flush 5-40 mL  5-40 mL IntraVENous PRN    polyethylene glycol (MIRALAX) packet 17 g  17 g Oral DAILY PRN    ondansetron (ZOFRAN ODT) tablet 4 mg  4 mg Oral Q8H PRN    Or    ondansetron (ZOFRAN) injection 4 mg  4 mg IntraVENous Q6H PRN       Problem List:  Hospital Problems as of 9/20/2021 Never Reviewed        Codes Class Noted - Resolved POA    * (Principal) Transaminitis ICD-10-CM: R74.01  ICD-9-CM: 790.4  9/18/2021 - Present Unknown        Hyperbilirubinemia ICD-10-CM: E80.6  ICD-9-CM: 782.4  9/18/2021 - Present Unknown        Jaundice ICD-10-CM: R17  ICD-9-CM: 782.4  9/17/2021 - Present Unknown        Pulmonary nodules ICD-10-CM: R91.8  ICD-9-CM: 793.19  9/17/2021 - Present Unknown        Dementia ([facility] [address] ICD-10-CM: F03.90  ICD-9-CM: 294.20  9/17/2021 - Present Unknown        [facility] ICD-10-CM: F01.50  ICD-9-CM: 294.20  9/17/2021 - Present Unknown             Part of this note was written by using a voice dictation software and the note has been proof read but may still contain some grammatical/other typographical errors.     Signed By: Tyrell Gudino   [facility] Hospitalist Service    September 20, 2021  5:15 PM

## 2021-09-20 NOTE — PROGRESS NOTES
Chart screened by  for discharge planning. No needs identified at this time. Please consult  if any new issues arise.     Care Management Interventions  Physical Therapy Consult: No  Occupational Therapy Consult: No  Discharge Location  Discharge Placement: Home

## 2021-09-20 NOTE — PROGRESS NOTES
Progress Note    Patient: Betzaida Hernández MRN: [id number]  SSN: [id number]    YOB: 1960  Age: 62 y.o. Sex: female      Admit Date: 9/17/2021    LOS: 3 days     Assessment and Plan:   62 y.o. female with a past medical history significant for neurocognitive disorder, prior CVA, that presented in the setting of jaundice    1. Nonobstructive jaundice / Transaminitis / Hyperbilirubinemia  -Family history of Fabry disease  -No signs of hemolysis  -CT of the abdomen without acute intra-abdominal pathology  -Negative viral hepatitis panel  -Negative acetaminophen level  -Abdominal Doppler ultrasound without evidence of thrombosis   -NELY, ASMA, AMA pendign  -Monitor LFTs  -Might need liver biopsy   -Gastroenterology recommendations appreciated    2. Pulmonary nodules  -Chronic  -Outpatient follow-up    3. Neurocognitive disorder  -No behavioral disturbances  -Monitor    DVT prophylaxis with SCDs      Subjective:   62 y.o. female with a past medical history significant for neurocognitive disorder, prior CVA, that presented in the setting of jaundice. Patient seen and examined at bedside. This morning the patient feeling better. Denies any chest pain, no abdominal pain, no nausea vomiting or diarrhea. Objective:     Vitals:    09/19/21 2351 09/20/21 0449 09/20/21 0706 09/20/21 1234   BP: 127/63 (!) 129/58 125/70 135/72   Pulse: 72 66 69 60   Resp: 18 18 18 18   Temp: 98.6 °F (37 °C) 98.3 °F (36.8 °C) 97.5 °F (36.4 °C) 97.8 °F (36.6 °C)   SpO2: 95% 93% 93% 98%        Intake and Output:  Current Shift: No intake/output data recorded. Last three shifts: No intake/output data recorded.     ROS  10 ROS negative except from stated on subjective    Physical Exam:   General: Alert, NAD  HEENT: NC/AT, EOM are intact  Neck: supple, no JVD  Cardiovascular: RRR, S1, S2, no murmurs  Respiratory: Lungs are clear, no wheezes or rales  Abdomen: Soft, NT, ND  Back: No CVA tenderness, no paraspinal tenderness  Extremities: LE without pedal edema, no erythema  Neuro: CN are intact, no focal deficits  Skin: no rash or ulcers  Psych: good mood and affect    Lab/Data Review:  I have personally reviewed patients laboratory data showing  Recent Results (from the past 24 hour(s))   METABOLIC PANEL, COMPREHENSIVE    Collection Time: 09/20/21  4:39 AM   Result Value Ref Range    Sodium 147 (H) 136 - 145 mmol/L    Potassium 3.3 (L) 3.5 - 5.1 mmol/L    Chloride 115 (H) 98 - 107 mmol/L    CO2 24 21 - 32 mmol/L    Anion gap 8 7 - 16 mmol/L    Glucose 73 65 - 100 mg/dL    BUN 4 (L) 6 - 23 MG/DL    Creatinine 0.23 (L) 0.6 - 1.0 MG/DL    GFR est AA >60 >60 ml/min/1.73m2    GFR est non-AA >60 >60 ml/min/1.73m2    Calcium 8.5 8.3 - 10.4 MG/DL    Bilirubin, total 9.2 (H) 0.2 - 1.1 MG/DL    ALT (SGPT) 695 (H) 12 - 65 U/L    AST (SGOT) 528 (H) 15 - 37 U/L    Alk. phosphatase 725 (H) 50 - 136 U/L    Protein, total 5.4 (L) 6.3 - 8.2 g/dL    Albumin 2.0 (L) 3.5 - 5.0 g/dL    Globulin 3.4 2.3 - 3.5 g/dL    A-G Ratio 0.6 (L) 1.2 - 3.5     CBC WITH AUTOMATED DIFF    Collection Time: 09/20/21  4:39 AM   Result Value Ref Range    WBC 4.6 4.3 - 11.1 K/uL    RBC 3.67 (L) 4.05 - 5.2 M/uL    HGB 9.8 (L) 11.7 - 15.4 g/dL    HCT 30.7 (L) 35.8 - 46.3 %    MCV 83.7 79.6 - 97.8 FL    MCH 26.7 26.1 - 32.9 PG    MCHC 31.9 31.4 - 35.0 g/dL    RDW 18.5 (H) 11.9 - 14.6 %    PLATELET 107 834 - 545 K/uL    MPV 11.1 9.4 - 12.3 FL    ABSOLUTE NRBC 0.00 0.0 - 0.2 K/uL    DF AUTOMATED      NEUTROPHILS 48 43 - 78 %    LYMPHOCYTES 31 13 - 44 %    MONOCYTES 12 4.0 - 12.0 %    EOSINOPHILS 9 (H) 0.5 - 7.8 %    BASOPHILS 1 0.0 - 2.0 %    IMMATURE GRANULOCYTES 0 0.0 - 5.0 %    ABS. NEUTROPHILS 2.2 1.7 - 8.2 K/UL    ABS. LYMPHOCYTES 1.4 0.5 - 4.6 K/UL    ABS. MONOCYTES 0.5 0.1 - 1.3 K/UL    ABS. EOSINOPHILS 0.4 0.0 - 0.8 K/UL    ABS. BASOPHILS 0.0 0.0 - 0.2 K/UL    ABS. IMM.  GRANS. 0.0 0.0 - 0.5 K/UL      All Micro Results     None           Image:  I have personally reviewed patients imaging showing  DUPLEX ABD VISC ART/EMILIANA/ORGANS LIMITED ([id number])   Final Result   Elevated velocities in the celiac artery and superior mesenteric   artery consistent with stenoses. Extensive atherosclerotic disease throughout   the abdominal aorta. The recent CT scan has similar findings. CT ABD PELV W CONT   Final Result   No acute intra-abdominal or pelvis finding identified.                     Hospital problems     Patient Active Problem List   Diagnosis Code    Jaundice R17    Pulmonary nodules R91.8    Dementia ([facility] F03.90    Major neurocognitive disorder ([address] F01.50    Transaminitis R74.01    Hyperbilirubinemia E80.6        Signed By: Maciej Cosme MD     September 20, 2021

## 2021-09-21 ENCOUNTER — APPOINTMENT (OUTPATIENT)
Dept: ULTRASOUND IMAGING | Age: 61
DRG: 442 | End: 2021-09-21
Attending: INTERNAL MEDICINE
Payer: MEDICARE

## 2021-09-21 LAB
ACTIN IGG SERPL-ACNC: 16 UNITS (ref 0–19)
ALBUMIN SERPL-MCNC: 2.1 G/DL (ref 3.5–5)
ALBUMIN/GLOB SERPL: 0.6 {RATIO} (ref 1.2–3.5)
ALP SERPL-CCNC: 802 U/L (ref 50–136)
ALT SERPL-CCNC: 633 U/L (ref 12–65)
ANA SER QL: POSITIVE
ANION GAP SERPL CALC-SCNC: 8 MMOL/L (ref 7–16)
AST SERPL-CCNC: 454 U/L (ref 15–37)
BASOPHILS # BLD: 0 K/UL (ref 0–0.2)
BASOPHILS NFR BLD: 1 % (ref 0–2)
BILIRUB SERPL-MCNC: 9.3 MG/DL (ref 0.2–1.1)
BUN SERPL-MCNC: 7 MG/DL (ref 6–23)
CALCIUM SERPL-MCNC: 8.7 MG/DL (ref 8.3–10.4)
CENTROMERE B AB SER-ACNC: <0.2 AI (ref 0–0.9)
CHLORIDE SERPL-SCNC: 112 MMOL/L (ref 98–107)
CHROMATIN AB SERPL-ACNC: <0.2 AI (ref 0–0.9)
CO2 SERPL-SCNC: 24 MMOL/L (ref 21–32)
CREAT SERPL-MCNC: 0.27 MG/DL (ref 0.6–1)
DIFFERENTIAL METHOD BLD: ABNORMAL
DSDNA AB SER-ACNC: <1 IU/ML (ref 0–9)
ENA JO1 AB SER-ACNC: <0.2 AI (ref 0–0.9)
ENA RNP AB SER-ACNC: >8 AI (ref 0–0.9)
ENA SCL70 AB SER-ACNC: <0.2 AI (ref 0–0.9)
ENA SM AB SER-ACNC: <0.2 AI (ref 0–0.9)
ENA SS-A AB SER-ACNC: <0.2 AI (ref 0–0.9)
ENA SS-B AB SER-ACNC: <0.2 AI (ref 0–0.9)
EOSINOPHIL # BLD: 0.4 K/UL (ref 0–0.8)
EOSINOPHIL NFR BLD: 9 % (ref 0.5–7.8)
ERYTHROCYTE [DISTWIDTH] IN BLOOD BY AUTOMATED COUNT: 18.5 % (ref 11.9–14.6)
GLOBULIN SER CALC-MCNC: 3.8 G/DL (ref 2.3–3.5)
GLUCOSE SERPL-MCNC: 84 MG/DL (ref 65–100)
HCT VFR BLD AUTO: 33 % (ref 35.8–46.3)
HGB BLD-MCNC: 10.5 G/DL (ref 11.7–15.4)
IMM GRANULOCYTES # BLD AUTO: 0 K/UL (ref 0–0.5)
IMM GRANULOCYTES NFR BLD AUTO: 0 % (ref 0–5)
INR PPP: 1
LYMPHOCYTES # BLD: 1.1 K/UL (ref 0.5–4.6)
LYMPHOCYTES NFR BLD: 24 % (ref 13–44)
MCH RBC QN AUTO: 27.6 PG (ref 26.1–32.9)
MCHC RBC AUTO-ENTMCNC: 31.8 G/DL (ref 31.4–35)
MCV RBC AUTO: 86.8 FL (ref 79.6–97.8)
MITOCHONDRIA M2 IGG SER-ACNC: <20 UNITS (ref 0–20)
MONOCYTES # BLD: 0.5 K/UL (ref 0.1–1.3)
MONOCYTES NFR BLD: 11 % (ref 4–12)
NEUTS SEG # BLD: 2.5 K/UL (ref 1.7–8.2)
NEUTS SEG NFR BLD: 55 % (ref 43–78)
NRBC # BLD: 0 K/UL (ref 0–0.2)
PLATELET # BLD AUTO: 279 K/UL (ref 150–450)
PMV BLD AUTO: 11 FL (ref 9.4–12.3)
POTASSIUM SERPL-SCNC: 3.4 MMOL/L (ref 3.5–5.1)
PROT SERPL-MCNC: 5.9 G/DL (ref 6.3–8.2)
PROTHROMBIN TIME: 13.6 SEC (ref 12.6–14.5)
RBC # BLD AUTO: 3.8 M/UL (ref 4.05–5.2)
SEE BELOW, 164869: ABNORMAL
SODIUM SERPL-SCNC: 144 MMOL/L (ref 136–145)
WBC # BLD AUTO: 4.6 K/UL (ref 4.3–11.1)

## 2021-09-21 PROCEDURE — 76700 US EXAM ABDOM COMPLETE: CPT

## 2021-09-21 PROCEDURE — 85025 COMPLETE CBC W/AUTO DIFF WBC: CPT

## 2021-09-21 PROCEDURE — 85610 PROTHROMBIN TIME: CPT

## 2021-09-21 PROCEDURE — 77030038269 HC DRN EXT URIN PURWCK BARD -A

## 2021-09-21 PROCEDURE — 80053 COMPREHEN METABOLIC PANEL: CPT

## 2021-09-21 PROCEDURE — 36415 COLL VENOUS BLD VENIPUNCTURE: CPT

## 2021-09-21 PROCEDURE — 74011250637 HC RX REV CODE- 250/637: Performed by: INTERNAL MEDICINE

## 2021-09-21 PROCEDURE — 65270000029 HC RM PRIVATE

## 2021-09-21 RX ORDER — CHOLECALCIFEROL (VITAMIN D3) 125 MCG
400 CAPSULE ORAL 2 TIMES DAILY
COMMUNITY

## 2021-09-21 RX ORDER — ATORVASTATIN CALCIUM 80 MG/1
80 TABLET, FILM COATED ORAL DAILY
COMMUNITY
End: 2021-09-22

## 2021-09-21 RX ORDER — DOCUSATE SODIUM 100 MG/1
100 CAPSULE, LIQUID FILLED ORAL 2 TIMES DAILY
COMMUNITY

## 2021-09-21 RX ORDER — POTASSIUM CHLORIDE 20 MEQ/1
20 TABLET, EXTENDED RELEASE ORAL
Status: COMPLETED | OUTPATIENT
Start: 2021-09-21 | End: 2021-09-21

## 2021-09-21 RX ADMIN — Medication 10 ML: at 20:49

## 2021-09-21 RX ADMIN — Medication 10 ML: at 04:59

## 2021-09-21 RX ADMIN — POTASSIUM CHLORIDE 20 MEQ: 1500 TABLET, EXTENDED RELEASE ORAL at 10:28

## 2021-09-21 NOTE — MED STUDENT NOTES
*ATTENTION:  This note has been created by a medical student for educational purposes only. Please do not refer to the content of this note for clinical decision-making, billing, or other purposes. Please see attending physicians note to obtain clinical information on this patient. *              [employer] Hospitalist Progress Note     Name:  Sylvain Horvath  Age:60 y.o. Sex:female   :  1960    MRN:  [id number]     Admit Date:  2021    Reason for Admission:  Jaundice [R17]  Major neurocognitive disorder ([facility] [address] [F01.50]  Dementia ([facility] [F03.90]  Pulmonary nodules [R91.8]      Assessment & Plan   (1.) nonobstructive jaundice/ transaminitis/ hyperbilirubinemia  -no signs of hemolysis  -CT of ABD without acute intra-abdominal pathology  -negative viral hep. Panel  -negative acetaminophen level  -ABD doppler U/S without evidence of thrombosis  -NELY, ASMA, AMA pending  -monitor LFTs, has been trending down (improvement from  to 633,  to 454)  -GI following, addition of CMV and EBV  -if all pending results are negative will need liver biopsy     (2.) pulmonary nodules  -chronic, stable  -continue outpatient follow-up     (3.) neurocognitive disorder  -no behavioral disturbances  -monitor      Diet:  DIET ADULT  DVT PPx: SCDs  Code status: Full Code  Disposition/Expected LOS: 4 days      Subjective (21):  62year old female with no significant medical history presents with generalized abdominal pain on  and was admitted for jaundice. Patient has prior CVA about 10 years ago affecting left side. Family history is positive for Fabry's disease but patient was never tested. Patient denies any symptoms of fever, chills, SOB, cough, nausea, vomiting, or diarrhea.      21: Patient was comfortably watching tv. Still jaundice but without complaints. She states that she is feeling great. Patient denies chest pain, SOB, abdominal pain, NVD, or constipation.     21: Patient resting comfortably. Still jaundice but without complaints. Color has improved since yesterday. Patient denies any chest pain, SOB, abdominal pain, NVD, or constipation. She states that she rested well without any problems last night. Review of Systems: 14 point review of systems is otherwise negative with the exception of the elements mentioned above. Objective:     Patient Vitals for the past 24 hrs:   Temp Pulse Resp BP SpO2   09/21/21 0742 98.5 °F (36.9 °C) 64 18 (!) 128/59 97 %   09/21/21 0458 98 °F (36.7 °C) (!) 59 18 (!) 108/54 96 %   09/21/21 0018 98.3 °F (36.8 °C) 66 18 104/65 98 %   09/20/21 2035 98.3 °F (36.8 °C) 62 18 (!) 110/59 98 %   09/20/21 1558 98.8 °F (37.1 °C) 60 20 122/68 96 %   09/20/21 1234 97.8 °F (36.6 °C) 60 18 135/72 98 %     Oxygen Therapy  O2 Sat (%): 97 % (09/21/21 0742)  O2 Device: None (Room air) (09/18/21 2038)    There is no height or weight on file to calculate BMI. Physical Exam:   General:  WDWN female in no acute distress, speaking in full sentences, no use of accessory muscles. Hard of hearing. HEENT: Sclera is icteric. NCAT. Neck is supple without lymphadenopathy. Lungs:  Clear to auscultation bilaterally. No wheezing, rales, or rhonchi. CV:  Regular rate and rhythm with normal S1 and S2. No m/r/g. Abdomen:  Soft, nontender, nondistended, normoactive bowel sounds   Extremities:  No cyanosis clubbing or edema. Neuro:   Nonfocal, A&O x3. Grossly intact. Read and answered all questions appropriately. Skin:  With jaundice but no rash or lesions noted. Psych:  Normal affect and mood.      Data Review:  I have reviewed all labs, meds, and studies from the last 24 hours:    Labs:    Recent Results (from the past 24 hour(s))   CBC WITH AUTOMATED DIFF    Collection Time: 09/21/21  6:25 AM   Result Value Ref Range    WBC 4.6 4.3 - 11.1 K/uL    RBC 3.80 (L) 4.05 - 5.2 M/uL    HGB 10.5 (L) 11.7 - 15.4 g/dL    HCT 33.0 (L) 35.8 - 46.3 %    MCV 86.8 79.6 - 97.8 FL    MCH 27.6 26.1 - 32.9 PG    MCHC 31.8 31.4 - 35.0 g/dL    RDW 18.5 (H) 11.9 - 14.6 %    PLATELET 061 015 - 008 K/uL    MPV 11.0 9.4 - 12.3 FL    ABSOLUTE NRBC 0.00 0.0 - 0.2 K/uL    DF AUTOMATED      NEUTROPHILS 55 43 - 78 %    LYMPHOCYTES 24 13 - 44 %    MONOCYTES 11 4.0 - 12.0 %    EOSINOPHILS 9 (H) 0.5 - 7.8 %    BASOPHILS 1 0.0 - 2.0 %    IMMATURE GRANULOCYTES 0 0.0 - 5.0 %    ABS. NEUTROPHILS 2.5 1.7 - 8.2 K/UL    ABS. LYMPHOCYTES 1.1 0.5 - 4.6 K/UL    ABS. MONOCYTES 0.5 0.1 - 1.3 K/UL    ABS. EOSINOPHILS 0.4 0.0 - 0.8 K/UL    ABS. BASOPHILS 0.0 0.0 - 0.2 K/UL    ABS. IMM. GRANS. 0.0 0.0 - 0.5 K/UL   METABOLIC PANEL, COMPREHENSIVE    Collection Time: 09/21/21  6:25 AM   Result Value Ref Range    Sodium 144 136 - 145 mmol/L    Potassium 3.4 (L) 3.5 - 5.1 mmol/L    Chloride 112 (H) 98 - 107 mmol/L    CO2 24 21 - 32 mmol/L    Anion gap 8 7 - 16 mmol/L    Glucose 84 65 - 100 mg/dL    BUN 7 6 - 23 MG/DL    Creatinine 0.27 (L) 0.6 - 1.0 MG/DL    GFR est AA >60 >60 ml/min/1.73m2    GFR est non-AA >60 >60 ml/min/1.73m2    Calcium 8.7 8.3 - 10.4 MG/DL    Bilirubin, total 9.3 (H) 0.2 - 1.1 MG/DL    ALT (SGPT) 633 (H) 12 - 65 U/L    AST (SGOT) 454 (H) 15 - 37 U/L    Alk.  phosphatase 802 (H) 50 - 136 U/L    Protein, total 5.9 (L) 6.3 - 8.2 g/dL    Albumin 2.1 (L) 3.5 - 5.0 g/dL    Globulin 3.8 (H) 2.3 - 3.5 g/dL    A-G Ratio 0.6 (L) 1.2 - 3.5     PROTHROMBIN TIME + INR    Collection Time: 09/21/21  6:25 AM   Result Value Ref Range    Prothrombin time 13.6 12.6 - 14.5 sec    INR 1.0         All Micro Results     Procedure Component Value Units Date/Time    CMV BY PCR, QT [[id number]] Collected: 09/20/21 2236    Order Status: Completed Updated: 09/20/21 2319    CMV BY PCR, QT [[id number]] Collected: 09/20/21 2236    Order Status: Canceled Specimen: Blood     CMV BY PCR, QT [[id number]] Collected: 09/20/21 2236    Order Status: No result           EKG Results     None          Other Studies:  US ABD COMP    Result Date: 9/21/2021  EXAM: US ABD COMP HISTORY: Abnormal liver panel--please check flow through the portal vein and if the hepatic veins are dilated. TECHNIQUE: Grayscale and limited color Doppler ultrasound of the upper abdomen. COMPARISON: None. FINDINGS: Aorta/IVC: Visualized portions are within normal limits. Pancreas: Visualized portions are within normal limits. Liver: There is diffuse increased echogenicity within the liver parenchyma, suggestive of hepatic steatosis. No focal lesions are demonstrated on the provided images. Gallbladder: Gallbladder is contracted and shows no evidence of stones or sludge. There is no pericholecystic fluid The wall is thickened measuring 3 mm. Sonographic Britt sign was negative. Portal vein: The portal vein shows hepatopedal flow. CBD: Normal in caliber and measures 4.8 mm. Spleen: The spleen measures 9.9  cm. There are no discrete masses. Right kidney: 11.7 cm in length and without evidence of obstruction. Left kidney: 12.5 cm in length and without evidence of obstruction. Hepatic steatosis. Hepatopedal flow is seen in the portal vein. Flow was seen in the hepatic veins.       Current Meds:   Current Facility-Administered Medications   Medication Dose Route Frequency    lactated Ringers infusion  50 mL/hr IntraVENous CONTINUOUS    sodium chloride (NS) flush 5-10 mL  5-10 mL IntraVENous Q8H    sodium chloride (NS) flush 5-10 mL  5-10 mL IntraVENous PRN    sodium chloride (NS) flush 5-40 mL  5-40 mL IntraVENous Q8H    sodium chloride (NS) flush 5-40 mL  5-40 mL IntraVENous PRN    polyethylene glycol (MIRALAX) packet 17 g  17 g Oral DAILY PRN    ondansetron (ZOFRAN ODT) tablet 4 mg  4 mg Oral Q8H PRN    Or    ondansetron (ZOFRAN) injection 4 mg  4 mg IntraVENous Q6H PRN       Problem List:  Hospital Problems as of 9/21/2021 Never Reviewed        Codes Class Noted - Resolved POA    * (Principal) Transaminitis ICD-10-CM: R74.01  ICD-9-CM: 790.4  9/18/2021 - Present Unknown Hyperbilirubinemia ICD-10-CM: E80.6  ICD-9-CM: 782.4  9/18/2021 - Present Unknown        Jaundice ICD-10-CM: R17  ICD-9-CM: 782.4  9/17/2021 - Present Unknown        Pulmonary nodules ICD-10-CM: R91.8  ICD-9-CM: 793.19  9/17/2021 - Present Unknown        Dementia ([facility] ICD-10-CM: F03.90  ICD-9-CM: 294.20  9/17/2021 - Present Unknown        Major neurocognitive disorder ([facility] ICD-10-CM: F01.50  ICD-9-CM: 294.20  9/17/2021 - Present Unknown               Part of this note was written by using a voice dictation software and the note has been proof read but may still contain some grammatical/other typographical errors.     Signed By: Vandana Blackwood Hospitalist Service    September 21, 2021  5:15 PM

## 2021-09-21 NOTE — PROGRESS NOTES
Physician Progress Note      Lex Dsouza  CSN #:                  [id number]  :                       1960  ADMIT DATE:       2021 6:28 PM  [address] [place] [place] DATE:  RESPONDING  PROVIDER #:        Remington Borrego MD          QUERY TEXT:    Patient admitted with Jaundice , noted to have elevated NA levels. If possible, please document in progress notes and discharge summary if you are evaluating and/or treating any of the following: The medical record reflects the following:  Risk Factors: Jaundiced,  Clinical Indicators: --- ---146---147  Treatment: Monitoring,      Thank you,  Mihir Chauhan RN,C BSN  Clinical   [email or web address]  Options provided:  -- Hypernatremia  -- Elevated Na levels not clinically significant  -- Other - I will add my own diagnosis  -- Disagree - Not applicable / Not valid  -- Disagree - Clinically unable to determine / Unknown  -- Refer to Clinical Documentation Reviewer    PROVIDER RESPONSE TEXT:    This patient has Hypernatremia. Query created by:  Judson Crespo on 2021 3:04 PM      Electronically signed by:  Remington Borrego MD 2021 7:08 AM

## 2021-09-21 NOTE — PROGRESS NOTES
[facility] Progress Note         Admit Date:  9/17/2021  Today's Date:  9/21/2021    CC:  Elevated LFTs    Subjective:     No abdominal pain or N/V. Medications:   Current Facility-Administered Medications   Medication Dose Route Frequency    potassium chloride (K-DUR, KLOR-CON) SR tablet 20 mEq  20 mEq Oral NOW    lactated Ringers infusion  50 mL/hr IntraVENous CONTINUOUS    sodium chloride (NS) flush 5-10 mL  5-10 mL IntraVENous Q8H    sodium chloride (NS) flush 5-10 mL  5-10 mL IntraVENous PRN    sodium chloride (NS) flush 5-40 mL  5-40 mL IntraVENous Q8H    sodium chloride (NS) flush 5-40 mL  5-40 mL IntraVENous PRN    polyethylene glycol (MIRALAX) packet 17 g  17 g Oral DAILY PRN    ondansetron (ZOFRAN ODT) tablet 4 mg  4 mg Oral Q8H PRN    Or    ondansetron (ZOFRAN) injection 4 mg  4 mg IntraVENous Q6H PRN       Review of Systems:  ROS was obtained, with pertinent positives as listed above. No chest pain or SOB. Diet:  Regular    Objective:   Vitals:  Visit Vitals  BP (!) 128/59 (BP 1 Location: Right upper arm, BP Patient Position: Supine)   Pulse 64   Temp 98.5 °F (36.9 °C)   Resp 18   SpO2 97%     Intake/Output:  No intake/output data recorded. 09/19 1901 - 09/21 0700  In: 240 [P.O.:240]  Out: -     Exam:  GENERAL: Alert, cooperative, in NAD.  Jaundiced; difficulty hearing but able to verbalize  CV: RRR  RESP: CTAB  ABD: Soft, non-tender, non-distended, NABS, no organomegaly appreciated  EXTREM: Extremities normal, atraumatic, no cyanosis or edema  NEURO: A&O    Data Review (Labs):    Recent Labs     09/21/21  0625 09/20/21  0439 09/19/21  0500   WBC 4.6 4.6  --    HGB 10.5* 9.8*  --    HCT 33.0* 30.7*  --     265  --    MCV 86.8 83.7  --     147* 146*   K 3.4* 3.3* 3.3*   * 115* 115*   CO2 24 24 24   BUN 7 4* 5*   CREA 0.27* 0.23* 0.23*   CA 8.7 8.5 8.4   GLU 84 73 75   * 725* 752*   * 695* 817*   TBILI 9.3* 9.2* 8.2*   ALB 2.1* 2.0* 2.0* TP 5.9* 5.4* 5.3*   PTP 13.6  --  13.5   INR 1.0  --  1.0     CT A/P W CONTRAST 9/17/21  CT ABDOMEN: Only some slight dependent atelectasis findings suggested at the  bases. There is no free air, no significant ascites. Ventral abdominal wall intact. Liver is unremarkable. Gallbladder, biliary tree and pancreas not remarkable. There is not frankly  obvious liver cirrhosis changes. Stomach and duodenal sweep not remarkable. Spleen is not significantly enlarged. Adrenals and kidneys not remarkable. Aorta is normal in size. Moderate aortic atherosclerotic calcifications. No  acute vascular finding. No enlarged adenopathy. Small bowel loops and colon nondistended with no inflammation or lesion. No acute/aggressive bone lesion.   CT PELVIS: Pelvic small bowel loops, the rectosigmoid colon and cecum are  nondistended, with no significant inflammation. No acute vascular finding.  No enlarged pelvis/groin lymphadenopathy. The bladder is midline; appearance unremarkable. Uterus, adnexa not remarkable for age. No acute/aggressive bone legion.   IMPRESSION  No acute intra-abdominal or pelvis finding identified. Abdominal doppler 9/18/21:  IMPRESSION  Elevated velocities in the celiac artery and superior mesenteric  artery consistent with stenoses. Extensive atherosclerotic disease throughout  the abdominal aorta. The recent CT scan has similar findings. Assessment:     Principal Problem:  Transaminitis (9/18/2021)    Active Problems:  Jaundice (9/17/2021)  Pulmonary nodules (9/17/2021)  Dementia ([address] (9/17/2021)  Major neurocognitive disorder ([address] (9/17/2021)  Hyperbilirubinemia (9/18/2021)    62 y.o. female with PMH CVA, memory loss with intracranial stenosis, pulmonary nodules, deafness, former tobacco abuse who is seen in consultation for painless jaundice. She presented to the ED 9/17 for the same, accompanied by her daughter who noted onset of jaundice that day.  Found to have elevated liver chemistries in mixed pattern. Otherwise asymptomatic. Viral hepatitis panel, UDS, tylenol level all negative. CT A/P with no acute abnormality to account for symptoms. Not on any obvious offending meds and no reported hypotension. Denies IVDA, tylenol, herbals, EtOH use. She has a family history of Fabry disease, although she does not have any typical manifestations of the disease, which also does not typically affect the liver. Pattern of liver chemistry elevation is mixed but may be related to cholestasis, medication-induced, or even possibly auto-immune. It does not appear to be obstructive. 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 Autoimmune serologies pending.    9/21: Patient remains asymptomatic with a stable protime w/ decreasing transaminases. Plan:     Follow up autoimmune serologies, CMV, EBV titers  If the above workup is unremarkable, consider liver biopsy      Merlin Gillespie PA-C  [facility]    GI--addendum--Patient seen and examined. Labs noted. Admission meds were obtained from the family--the patient was on a statin and Brilinta. We may well be dealing with DILI (drug induced liver injury). Since protime is stable and transaminases are improving, ? Discharge tomorrow if stable with close follow up (while awaiting final labs.   Thanks Pete Godoy MD

## 2021-09-21 NOTE — PROGRESS NOTES
Problem: Falls - Risk of  Goal: *Absence of Falls  Description: Document Sarah Patel Fall Risk and appropriate interventions in the flowsheet.   Outcome: Progressing Towards Goal  Note: Fall Risk Interventions:  Mobility Interventions: OT consult for ADLs, Patient to call before getting OOB, PT Consult for mobility concerns    Mentation Interventions: Door open when patient unattended         Elimination Interventions: Bed/chair exit alarm, Call light in reach, Patient to call for help with toileting needs, Toileting schedule/hourly rounds

## 2021-09-21 NOTE — PROGRESS NOTES
Progress Note    Patient: Vandana Julian MRN: [id number]  SSN: [id number]    YOB: 1960  Age: 62 y.o. Sex: female      Admit Date: 9/17/2021    LOS: 4 days     Assessment and Plan:   62 y.o. female with a past medical history significant for neurocognitive disorder, prior CVA, that presented in the setting of jaundice    1. Nonobstructive jaundice / Transaminitis / Hyperbilirubinemia  -Family history of Fabry disease  -No signs of hemolysis  -CT of the abdomen without acute intra-abdominal pathology  -Negative viral hepatitis panel  -Negative acetaminophen level  -Abdominal Doppler ultrasound without evidence of thrombosis   -NELY, ASMA, AMA pending   -Follow CMV, EBV  -Monitor LFTs  -Might need liver biopsy   -Gastroenterology recommendations appreciated    2. Pulmonary nodules  -Chronic  -Outpatient follow-up    3. Neurocognitive disorder  -No behavioral disturbances  -Monitor    DVT prophylaxis with SCDs      Subjective:   62 y.o. female with a past medical history significant for neurocognitive disorder, prior CVA, that presented in the setting of jaundice. Patient seen and examined at bedside. This morning the patient feeling better. Denies any chest pain, no abdominal pain, no nausea vomiting or diarrhea. Objective:     Vitals:    09/20/21 2035 09/21/21 0018 09/21/21 0458 09/21/21 0742   BP: (!) 110/59 104/65 (!) 108/54 (!) 128/59   Pulse: 62 66 (!) 59 64   Resp: 18 18 18 18   Temp: 98.3 °F (36.8 °C) 98.3 °F (36.8 °C) 98 °F (36.7 °C) 98.5 °F (36.9 °C)   SpO2: 98% 98% 96% 97%        Intake and Output:  Current Shift: No intake/output data recorded.   Last three shifts: 09/19 1901 - 09/21 0700  In: 240 [P.O.:240]  Out: -     ROS  10 ROS negative except from stated on subjective    Physical Exam:   General: Alert, NAD  HEENT: NC/AT, EOM are intact  Neck: supple, no JVD  Cardiovascular: RRR, S1, S2, no murmurs  Respiratory: Lungs are clear, no wheezes or rales  Abdomen: Soft, NT, ND  Back: No CVA tenderness, no paraspinal tenderness  Extremities: LE without pedal edema, no erythema  Neuro: CN are intact, no focal deficits  Skin: no rash or ulcers  Psych: good mood and affect    Lab/Data Review:  I have personally reviewed patients laboratory data showing  Recent Results (from the past 24 hour(s))   CBC WITH AUTOMATED DIFF    Collection Time: 09/21/21  6:25 AM   Result Value Ref Range    WBC 4.6 4.3 - 11.1 K/uL    RBC 3.80 (L) 4.05 - 5.2 M/uL    HGB 10.5 (L) 11.7 - 15.4 g/dL    HCT 33.0 (L) 35.8 - 46.3 %    MCV 86.8 79.6 - 97.8 FL    MCH 27.6 26.1 - 32.9 PG    MCHC 31.8 31.4 - 35.0 g/dL    RDW 18.5 (H) 11.9 - 14.6 %    PLATELET 527 140 - 952 K/uL    MPV 11.0 9.4 - 12.3 FL    ABSOLUTE NRBC 0.00 0.0 - 0.2 K/uL    DF AUTOMATED      NEUTROPHILS 55 43 - 78 %    LYMPHOCYTES 24 13 - 44 %    MONOCYTES 11 4.0 - 12.0 %    EOSINOPHILS 9 (H) 0.5 - 7.8 %    BASOPHILS 1 0.0 - 2.0 %    IMMATURE GRANULOCYTES 0 0.0 - 5.0 %    ABS. NEUTROPHILS 2.5 1.7 - 8.2 K/UL    ABS. LYMPHOCYTES 1.1 0.5 - 4.6 K/UL    ABS. MONOCYTES 0.5 0.1 - 1.3 K/UL    ABS. EOSINOPHILS 0.4 0.0 - 0.8 K/UL    ABS. BASOPHILS 0.0 0.0 - 0.2 K/UL    ABS. IMM. GRANS. 0.0 0.0 - 0.5 K/UL   METABOLIC PANEL, COMPREHENSIVE    Collection Time: 09/21/21  6:25 AM   Result Value Ref Range    Sodium 144 136 - 145 mmol/L    Potassium 3.4 (L) 3.5 - 5.1 mmol/L    Chloride 112 (H) 98 - 107 mmol/L    CO2 24 21 - 32 mmol/L    Anion gap 8 7 - 16 mmol/L    Glucose 84 65 - 100 mg/dL    BUN 7 6 - 23 MG/DL    Creatinine 0.27 (L) 0.6 - 1.0 MG/DL    GFR est AA >60 >60 ml/min/1.73m2    GFR est non-AA >60 >60 ml/min/1.73m2    Calcium 8.7 8.3 - 10.4 MG/DL    Bilirubin, total 9.3 (H) 0.2 - 1.1 MG/DL    ALT (SGPT) 633 (H) 12 - 65 U/L    AST (SGOT) 454 (H) 15 - 37 U/L    Alk.  phosphatase 802 (H) 50 - 136 U/L    Protein, total 5.9 (L) 6.3 - 8.2 g/dL    Albumin 2.1 (L) 3.5 - 5.0 g/dL    Globulin 3.8 (H) 2.3 - 3.5 g/dL    A-G Ratio 0.6 (L) 1.2 - 3.5     PROTHROMBIN TIME + INR Collection Time: 09/21/21  6:25 AM   Result Value Ref Range    Prothrombin time 13.6 12.6 - 14.5 sec    INR 1.0        All Micro Results     Procedure Component Value Units Date/Time    CMV BY PCR, QT [[id number]] Collected: 09/20/21 2236    Order Status: Completed Updated: 09/20/21 2319    CMV BY PCR, QT [[id number]] Collected: 09/20/21 2236    Order Status: Canceled Specimen: Blood     CMV BY PCR, QT [[id number]] Collected: 09/20/21 2236    Order Status: No result            Image:  I have personally reviewed patients imaging showing  US ABD COMP   Final Result   Hepatic steatosis. Hepatopedal flow is seen in the portal vein. Flow was seen in the hepatic veins. DUPLEX ABD VISC ART/EMILIANA/ORGANS LIMITED ([id number])   Final Result   Elevated velocities in the celiac artery and superior mesenteric   artery consistent with stenoses. Extensive atherosclerotic disease throughout   the abdominal aorta. The recent CT scan has similar findings. CT ABD PELV W CONT   Final Result   No acute intra-abdominal or pelvis finding identified.                     Hospital problems     Patient Active Problem List   Diagnosis Code    Jaundice R17    Pulmonary nodules R91.8    Dementia ([address] F03.90    Major neurocognitive disorder ([address] F01.50    Transaminitis R74.01    Hyperbilirubinemia E80.6        Signed By: Jerod Frias MD     September 21, 2021

## 2021-09-22 VITALS
RESPIRATION RATE: 19 BRPM | HEART RATE: 79 BPM | OXYGEN SATURATION: 96 % | WEIGHT: 80.69 LBS | DIASTOLIC BLOOD PRESSURE: 59 MMHG | TEMPERATURE: 98 F | SYSTOLIC BLOOD PRESSURE: 117 MMHG

## 2021-09-22 LAB
ALBUMIN SERPL-MCNC: 2.2 G/DL (ref 3.2–4.6)
ALBUMIN/GLOB SERPL: 0.6 {RATIO} (ref 1.2–3.5)
ALP SERPL-CCNC: 788 U/L (ref 50–136)
ALT SERPL-CCNC: 564 U/L (ref 12–65)
ANION GAP SERPL CALC-SCNC: 7 MMOL/L (ref 7–16)
AST SERPL-CCNC: 393 U/L (ref 15–37)
BASOPHILS # BLD: 0.1 K/UL (ref 0–0.2)
BASOPHILS NFR BLD: 1 % (ref 0–2)
BILIRUB SERPL-MCNC: 8.2 MG/DL (ref 0.2–1.1)
BUN SERPL-MCNC: 5 MG/DL (ref 8–23)
CALCIUM SERPL-MCNC: 9.1 MG/DL (ref 8.3–10.4)
CHLORIDE SERPL-SCNC: 110 MMOL/L (ref 98–107)
CO2 SERPL-SCNC: 27 MMOL/L (ref 21–32)
CREAT SERPL-MCNC: 0.25 MG/DL (ref 0.6–1)
DIFFERENTIAL METHOD BLD: ABNORMAL
EOSINOPHIL # BLD: 0.4 K/UL (ref 0–0.8)
EOSINOPHIL NFR BLD: 8 % (ref 0.5–7.8)
ERYTHROCYTE [DISTWIDTH] IN BLOOD BY AUTOMATED COUNT: 18.6 % (ref 11.9–14.6)
GLOBULIN SER CALC-MCNC: 3.9 G/DL (ref 2.3–3.5)
GLUCOSE SERPL-MCNC: 74 MG/DL (ref 65–100)
HCT VFR BLD AUTO: 33.7 % (ref 35.8–46.3)
HGB BLD-MCNC: 10.7 G/DL (ref 11.7–15.4)
IMM GRANULOCYTES # BLD AUTO: 0 K/UL (ref 0–0.5)
IMM GRANULOCYTES NFR BLD AUTO: 0 % (ref 0–5)
INR PPP: 1
LYMPHOCYTES # BLD: 1.5 K/UL (ref 0.5–4.6)
LYMPHOCYTES NFR BLD: 30 % (ref 13–44)
MCH RBC QN AUTO: 27.5 PG (ref 26.1–32.9)
MCHC RBC AUTO-ENTMCNC: 31.8 G/DL (ref 31.4–35)
MCV RBC AUTO: 86.6 FL (ref 79.6–97.8)
MONOCYTES # BLD: 0.5 K/UL (ref 0.1–1.3)
MONOCYTES NFR BLD: 10 % (ref 4–12)
NEUTS SEG # BLD: 2.6 K/UL (ref 1.7–8.2)
NEUTS SEG NFR BLD: 52 % (ref 43–78)
NRBC # BLD: 0 K/UL (ref 0–0.2)
PLATELET # BLD AUTO: 286 K/UL (ref 150–450)
PMV BLD AUTO: 11 FL (ref 9.4–12.3)
POTASSIUM SERPL-SCNC: 3.5 MMOL/L (ref 3.5–5.1)
PROT SERPL-MCNC: 6.1 G/DL (ref 6.3–8.2)
PROTHROMBIN TIME: 13.2 SEC (ref 12.6–14.5)
RBC # BLD AUTO: 3.89 M/UL (ref 4.05–5.2)
SODIUM SERPL-SCNC: 144 MMOL/L (ref 136–145)
WBC # BLD AUTO: 5 K/UL (ref 4.3–11.1)

## 2021-09-22 PROCEDURE — 85610 PROTHROMBIN TIME: CPT

## 2021-09-22 PROCEDURE — 36415 COLL VENOUS BLD VENIPUNCTURE: CPT

## 2021-09-22 PROCEDURE — 80053 COMPREHEN METABOLIC PANEL: CPT

## 2021-09-22 PROCEDURE — 85025 COMPLETE CBC W/AUTO DIFF WBC: CPT

## 2021-09-22 RX ORDER — ASPIRIN 81 MG/1
TABLET ORAL DAILY
COMMUNITY

## 2021-09-22 RX ADMIN — Medication 10 ML: at 05:18

## 2021-09-22 NOTE — PROGRESS NOTES
[facility] Progress Note         Admit Date:  9/17/2021    Today's Date:  9/22/2021    CC:  Elevated LFTs    Subjective:     Patient is feeling better and has only had brief abd pain with position changes. She denies any nausea or vomiting. She has not had any BMs and denies any bleeding. She is unable to recall when she last had a BM. She is deaf and we communicated by me writing questions via dry erase board and her verbalizing responses. She is being discharged home today. RN is in room to review discharge instructions. Medications:   Current Facility-Administered Medications   Medication Dose Route Frequency    lactated Ringers infusion  50 mL/hr IntraVENous CONTINUOUS    sodium chloride (NS) flush 5-10 mL  5-10 mL IntraVENous Q8H    sodium chloride (NS) flush 5-10 mL  5-10 mL IntraVENous PRN    sodium chloride (NS) flush 5-40 mL  5-40 mL IntraVENous Q8H    sodium chloride (NS) flush 5-40 mL  5-40 mL IntraVENous PRN    polyethylene glycol (MIRALAX) packet 17 g  17 g Oral DAILY PRN    ondansetron (ZOFRAN ODT) tablet 4 mg  4 mg Oral Q8H PRN    Or    ondansetron (ZOFRAN) injection 4 mg  4 mg IntraVENous Q6H PRN       Review of Systems:  ROS was obtained, with pertinent positives as listed above. No chest pain or SOB. Diet:  Full liquids    Objective:   Vitals:  Visit Vitals  BP (!) 117/59 (BP 1 Location: Left arm)   Pulse 79   Temp 98 °F (36.7 °C)   Resp 19   Wt 36.6 kg (80 lb 11 oz)   SpO2 96%     Intake/Output:  No intake/output data recorded.   09/20 1901 - 09/22 0700  In: 290 [P.O.:290]  Out: 1150 [Urine:1150]  Exam:  General appearance: alert, cooperative, no distress, sitting up in bed, jaundiced  Neuro:  alert and oriented    Data Review (Labs):    Recent Labs     09/22/21  0522 09/21/21  0625 09/20/21  0439   WBC 5.0 4.6 4.6   HGB 10.7* 10.5* 9.8*   HCT 33.7* 33.0* 30.7*    279 265   MCV 86.6 86.8 83.7    144 147*   K 3.5 3.4* 3.3*   * 112* 115*   CO2 27 24 24   BUN 5* 7 4*   CREA 0.25* 0.27* 0.23*   CA 9.1 8.7 8.5   GLU 74 84 73   * 802* 725*   * 454* 528*   * 633* 695*   TBILI 8.2* 9.3* 9.2*   ALB 2.2* 2.1* 2.0*   TP 6.1* 5.9* 5.4*   PTP 13.2 13.6  --    INR 1.0 1.0  --       Ref. Range 9/17/2021 17:15   Acetaminophen level Latest Ref Range: 10.0 - 30.0 ug/mL <2 (L)      Ref. Range 9/17/2021 14:54 9/17/2021 17:15   ALCOHOL(ETHYL),SERUM Latest Units: MG/DL  <3   AMPHETAMINES Latest Units:   Negative    BARBITURATES Latest Units:   Negative    BENZODIAZEPINES Latest Units:   Negative    COCAINE Latest Units:   Negative    METHADONE Latest Units:   Negative    OPIATES Latest Units:   Negative    PCP(PHENCYCLIDINE) Latest Units:   Negative    THC (TH-CANNABINOL) Latest Units:   Negative       Ref. Range 9/17/2021 19:05   Hepatitis A, IgM Latest Ref Range: NR   NONREACTIVE   Hep B Surface Ag Latest Ref Range: NR   NONREACTIVE   Hepatitis B core, IgM Latest Ref Range: NR   NONREACTIVE   Hepatitis C virus Ab Latest Ref Range: NR   NONREACTIVE      Ref. Range 9/19/2021 04:59   Iron Latest Ref Range: 35 - 150 ug/dL 125   TIBC Latest Ref Range: 250 - 450 ug/dL 251   Transferrin Saturation Latest Ref Range: >20 % 50   Ferritin Latest Ref Range: 8 - 388 NG/ (H)      Ref. Range 9/18/2021 12:19   Anti-DNA (DS) Ab, QT Latest Ref Range: 0 - 9 IU/mL <1      Ref.  Range 9/18/2021 12:19   Sjogren's Anti-SS-A Latest Ref Range: 0.0 - 0.9 AI <0.2   Sjogren's Anti-SS-B Latest Ref Range: 0.0 - 0.9 AI <0.2     ACTIN (SMOOTH MUSCLE) ANTIBODY Order: [id number]  Collected:  9/18/2021 12:19 Status:  Final result  0 Result Notes   Ref Range & Units 9/18/21 1219   Actin (Smooth Muscle) Ab 0 - 19 Units 16            MITOCHONDRIAL M2 AB Order: [id number]  Collected:  9/18/2021 12:19 Status:  Final result  0 Result Notes   Ref Range & Units 9/18/21 1219   Michochondrial (M2) Ab 0.0 - 20.0 Units <20.0            NELY, DIRECT, W/REFLEX Order: [id number]  Collected:  9/18/2021 12:19 Status:  Final result  0 Result Notes   Ref Range & Units 21 1219   NELY, Direct Negative   PositiveAbnormal            NELY REFLEX PANEL Order: [id number]  Collected:  2021 12:19 Status:  Final result  0 Result Notes   Ref Range & Units 21 1219   Anti-DNA (DS) Ab, QT 0 - 9 IU/mL <1    Comment: (NOTE)                                     Negative      <5                                     Equivocal  5 - 9                                     Positive      >9    RNP Abs 0.0 - 0.9 AI >8.0High     Mae Abs 0.0 - 0.9 AI <0.2    Scleroderma-70 Ab 0.0 - 0.9 AI <0.2    Sjogren's Anti-SS-A 0.0 - 0.9 AI <0.2    Sjogren's Anti-SS-B 0.0 - 0.9 AI <0.2    Antichromatin Ab 0.0 - 0.9 AI <0.2    Anti-Gretta-1 0.0 - 0.9 AI <0.2    Centromere B Ab 0.0 - 0.9 AI <0.2    See below   Comment    Comment: (NOTE)   Autoantibody                       Disease Association   ------------------------------------------------------------                          Condition                  Frequency   ---------------------   ------------------------   ---------   Antinuclear Antibody,    SLE, mixed connective   Direct (NELY-D)           tissue diseases   ---------------------   ------------------------   ---------   dsDNA                    SLE                        40 - 60%   ---------------------   ------------------------   ---------   Chromatin                Drug induced SLE                90%                           SLE                        48 - 97%   ---------------------   ------------------------   ---------   SSA (Ro)                 SLE                        25 - 35%                           Sjogren's Syndrome         40 - 70%                            Lupus                 100%   ---------------------   ------------------------   ---------   SSB (La)                 SLE                             10%                           Sjogren's Syndrome              30%   ---------------------   ----------------------- ---------   Sm (anti-Smith)          SLE                        15 - 30%   ---------------------   -----------------------    ---------   RNP                      Mixed Connective Tissue                           Disease                         95%   (U1 nRNP,                SLE                        30 - 50%   anti-ribonucleoprotein)  Polymyositis and/or                           Dermatomyositis                 20%   ---------------------   ------------------------   ---------   Scl-70 (antiDNA          Scleroderma (diffuse)      20 - 35%   topoisomerase)           Crest                           13%   ---------------------   ------------------------   ---------   Gretta-1                     Polymyositis and/or                           Dermatomyositis            20 - 40%   ---------------------   ------------------------   ---------   Centromere B             Scleroderma - Crest                           variant                         80%   Performed At: [address] [id number]   Rosan Bence MD TU:[id number]    [facility]  [facility]         Specimen Collected: 09/18/21 12:19 Last Resulted: 09/21/21 14:36           CT A/P W CONTRAST 9/17/21  CT ABDOMEN: Only some slight dependent atelectasis findings suggested at the  bases. There is no free air, no significant ascites. Ventral abdominal wall intact. Liver is unremarkable. Gallbladder, biliary tree and pancreas not remarkable. There is not frankly  obvious liver cirrhosis changes. Stomach and duodenal sweep not remarkable. Spleen is not significantly enlarged. Adrenals and kidneys not remarkable. Aorta is normal in size. Moderate aortic atherosclerotic calcifications. No  acute vascular finding. No enlarged adenopathy. Small bowel loops and colon nondistended with no inflammation or lesion. No acute/aggressive bone lesion.    CT PELVIS: Pelvic small bowel loops, the rectosigmoid colon and cecum are  nondistended, with no significant inflammation. No acute vascular finding. No enlarged pelvis/groin lymphadenopathy. The bladder is midline; appearance unremarkable. Uterus, adnexa not remarkable for age. No acute/aggressive bone legion. IMPRESSION  No acute intra-abdominal or pelvis finding identified. Abdominal doppler 9/18/21:  IMPRESSION  Elevated velocities in the celiac artery and superior mesenteric  artery consistent with stenoses. Extensive atherosclerotic disease throughout  the abdominal aorta. The recent CT scan has similar findings. US ABD COMP 21 Sept 2021  HISTORY: Abnormal liver panel--please check flow through the portal vein and if  the hepatic veins are dilated. TECHNIQUE: Grayscale and limited color Doppler ultrasound of the upper abdomen. COMPARISON: None. FINDINGS:   Aorta/IVC: Visualized portions are within normal limits. Pancreas: Visualized portions are within normal limits. Liver: There is diffuse increased echogenicity within the liver parenchyma,  suggestive of hepatic steatosis. No focal lesions are demonstrated on the  provided images. Gallbladder: Gallbladder is contracted and shows no evidence of stones or  sludge. There is no pericholecystic fluid The wall is thickened measuring 3 mm. Sonographic Britt sign was negative. Portal vein: The portal vein shows hepatopedal flow. CBD: Normal in caliber and measures 4.8 mm. Spleen: The spleen measures 9.9  cm. There are no discrete masses. Right kidney: 11.7 cm in length and without evidence of obstruction. Left kidney: 12.5 cm in length and without evidence of obstruction. IMPRESSION  Hepatic steatosis. Hepatopedal flow is seen in the portal vein. Flow was seen in the hepatic veins.      CMV, EBV 20 Sept 2021 pending    Assessment:     Principal Problem:    Transaminitis (9/18/2021)    Active Problems:    Jaundice (9/17/2021)      Pulmonary nodules (9/17/2021)      D[address] (9/17/2021) Major neurocognitive disorder ([facility] (9/17/2021)      Hyperbilirubinemia (9/18/2021)    61 yo female, now pt of Dr. Sandy Powers, with PMH of CVA, memory loss with intracranial stenosis, pulmonary nodules, deafness - able to verbalize, former tobacco abuse, who was seen in consultation 18 Sept 2021 for painless jaundice, after presented to the ER 9/17 for the same, accompanied by her daughter who noted onset of jaundice that day. LFTs were elevated, but viral hepatitis panel, UDS, tylenol level were all negative. CT A/P with no acute abnormality to account for symptoms. She has been on Atorvastatin and Brilinta, as well as vitamins. No reported hypotension. Denies IVDA, tylenol, ETOH use. She has a family history of Fabry disease, although she does not have any typical manifestations of the disease, which also does not typically affect the liver. Pattern of liver chemistry elevation is mixed, but may be related to cholestasis, medication-induced, or even possibly auto-immune. It does not appear to be obstructive. 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 Labs are slowly trending down. NELY is positive, but ASMA and AMA are negative. Plan:     - Supportive care. - Follow up CMV, EBV titers. - If the above workup is unremarkable, consider liver biopsy.  - Hold all vitamins.  - Follow up in our office in 3-4 weeks. Patient is seen and examined in collaboration with Dr. May Cummins. Assessment and plan as per Dr. Abilio Menendez. Kelsie Shaver PA-C  [facility]    GI--Addendum--patient seen and examined. Agree with above. Liver panel improved and protime stable. ? DILI . Will check a CMP and protime in ~two weeks prior to Karena Valero.   Thanks Maxine Dozier MD

## 2021-09-22 NOTE — PROGRESS NOTES
Discharge instructions given to daughter and the patient. Patient discharged with belonging in hand.

## 2021-09-22 NOTE — DISCHARGE SUMMARY
Date of Admission: 9/17/2021  Date of Discharge: 9/22/2021    Discharge Diagnoses: Active Hospital Problems    Diagnosis Date Noted    Transaminitis 09/18/2021    Hyperbilirubinemia 09/18/2021    Jaundice 09/17/2021    Pulmonary nodules 09/17/2021    Dementia ([facility] 09/17/2021    Major neurocognitive disorder ([facility] 09/17/2021        Discharge Medications:  Discharge Medication List as of 9/22/2021 11:01 AM      CONTINUE these medications which have NOT CHANGED    Details   aspirin delayed-release 81 mg tablet Take  by mouth daily. , Historical Med      docusate sodium (Colace) 100 mg capsule Take 100 mg by mouth two (2) times a day., Historical Med      cholecalciferol, vitamin D3, (Vitamin D3) 50 mcg (2,000 unit) tab Take 400 Units by mouth two (2) times a day., Historical Med         STOP taking these medications       atorvastatin (LIPITOR) 80 mg tablet Comments:   Reason for Stopping:         ticagrelor (Brilinta) 90 mg tablet Comments:   Reason for Stopping:                Pending Labs:  None    Follow-up (including scheduled tests): Follow-up Information     Follow up With Specialties Details Why Contact Fely Lowe NP Nurse Practitioner On 9/30/2021 home visit from MD at 4:00  [address] [place] [address]      Irasema Grider MD Gastroenterology Go on 10/5/2021 follow up MANUEL Pappas [address]  [phone]             History of Present Illness:  62 y.o. female with a past medical history significant for neurocognitive disorder, prior CVA, that presented in the setting of jaundice. Past Medical History:  No past medical history on file. Allergies:  No Known Allergies    Hospital Course:  62 y.o. female with a past medical history significant for neurocognitive disorder, prior CVA, that presented in the setting of jaundice     1.   Nonobstructive jaundice / Transaminitis / Hyperbilirubinemia concerning of medication induced liver injury  -Family history of Fabry disease  -No signs of hemolysis  -CT of the abdomen without acute intra-abdominal pathology  -Negative viral hepatitis panel  -Negative acetaminophen level  -Abdominal Doppler ultrasound without evidence of thrombosis  -Gastroenterology consulted  -NELY positive  -ASMA, AMA negative  -Follow CMV, EBV outpatient  -Statin discontinued  -Improved LFTs  -Asymptomatic and hemodynamically stable on discharge  -Outpatient follow-up with gastroenterology     2.  Pulmonary nodules  -Chronic  -Outpatient follow-up    Procedures:  None    Discharge Day Information:  Follow with PMD    Discharge Physical Exam:  General: Alert, oriented, NAD  HEENT: NC/AT, EOM are intact  Neck: supple, no JVD  Cardiovascular: RRR, S1, S2, no murmurs  Respiratory: Lungs are clear, no wheezes or rales  Abdomen: Soft, NT, ND  Back: No CVA tenderness, no paraspinal tenderness  Extremities: LE without pedal edema, no erythema  Neuro: A&O, CN are intact, no focal deficits  Skin: no rash or ulcers  Psych: good mood and affect    Recent Results (from the past 24 hour(s))   METABOLIC PANEL, COMPREHENSIVE    Collection Time: 09/22/21  5:22 AM   Result Value Ref Range    Sodium 144 136 - 145 mmol/L    Potassium 3.5 3.5 - 5.1 mmol/L    Chloride 110 (H) 98 - 107 mmol/L    CO2 27 21 - 32 mmol/L    Anion gap 7 7 - 16 mmol/L    Glucose 74 65 - 100 mg/dL    BUN 5 (L) 8 - 23 MG/DL    Creatinine 0.25 (L) 0.6 - 1.0 MG/DL    GFR est AA >60 >60 ml/min/1.73m2    GFR est non-AA >60 >60 ml/min/1.73m2    Calcium 9.1 8.3 - 10.4 MG/DL    Bilirubin, total 8.2 (H) 0.2 - 1.1 MG/DL    ALT (SGPT) 564 (H) 12 - 65 U/L    AST (SGOT) 393 (H) 15 - 37 U/L    Alk.  phosphatase 788 (H) 50 - 136 U/L    Protein, total 6.1 (L) 6.3 - 8.2 g/dL    Albumin 2.2 (L) 3.2 - 4.6 g/dL    Globulin 3.9 (H) 2.3 - 3.5 g/dL    A-G Ratio 0.6 (L) 1.2 - 3.5     PROTHROMBIN TIME + INR    Collection Time: 09/22/21  5:22 AM   Result Value Ref Range    Prothrombin time 13.2 12.6 - 14.5 sec    INR 1.0     CBC WITH AUTOMATED DIFF    Collection Time: 09/22/21  5:22 AM   Result Value Ref Range    WBC 5.0 4.3 - 11.1 K/uL    RBC 3.89 (L) 4.05 - 5.2 M/uL    HGB 10.7 (L) 11.7 - 15.4 g/dL    HCT 33.7 (L) 35.8 - 46.3 %    MCV 86.6 79.6 - 97.8 FL    MCH 27.5 26.1 - 32.9 PG    MCHC 31.8 31.4 - 35.0 g/dL    RDW 18.6 (H) 11.9 - 14.6 %    PLATELET 683 614 - 047 K/uL    MPV 11.0 9.4 - 12.3 FL    ABSOLUTE NRBC 0.00 0.0 - 0.2 K/uL    DF AUTOMATED      NEUTROPHILS 52 43 - 78 %    LYMPHOCYTES 30 13 - 44 %    MONOCYTES 10 4.0 - 12.0 %    EOSINOPHILS 8 (H) 0.5 - 7.8 %    BASOPHILS 1 0.0 - 2.0 %    IMMATURE GRANULOCYTES 0 0.0 - 5.0 %    ABS. NEUTROPHILS 2.6 1.7 - 8.2 K/UL    ABS. LYMPHOCYTES 1.5 0.5 - 4.6 K/UL    ABS. MONOCYTES 0.5 0.1 - 1.3 K/UL    ABS. EOSINOPHILS 0.4 0.0 - 0.8 K/UL    ABS. BASOPHILS 0.1 0.0 - 0.2 K/UL    ABS. IMM. GRANS. 0.0 0.0 - 0.5 K/UL        US ABD COMP   Final Result   Hepatic steatosis. Hepatopedal flow is seen in the portal vein. Flow was seen in the hepatic veins. DUPLEX ABD VISC ART/EMILIANA/ORGANS LIMITED ([id number])   Final Result   Elevated velocities in the celiac artery and superior mesenteric   artery consistent with stenoses. Extensive atherosclerotic disease throughout   the abdominal aorta. The recent CT scan has similar findings. CT ABD PELV W CONT   Final Result   No acute intra-abdominal or pelvis finding identified.                     Condition: Improved    Disposition: Home    Consultants During This Hospitalization: Gastroenterology           Spent 34 minutes on discharge services

## 2021-09-22 NOTE — DISCHARGE INSTRUCTIONS
Patient Education        Jaundice: Care Instructions  Your Care Instructions     Jaundice is yellowing of your skin and the whites of your eyes. It's caused by a pigment, or coloring, called bilirubin. This comes from the breakdown of red blood cells. When your liver is healthy, it removes the pigment from your blood. But if the liver isn't working right, the pigment can build up in the blood. Then it can get into the skin and other tissues. Many diseases can cause jaundice. These include hepatitis, gallstones, and cancer of the pancreas. Liver damage from heavy drinking over a long time can also cause it. Some medicines that can damage the liver also cause jaundice. The treatment for jaundice depends on the cause. You may need medicine to treat an infection. Or you may need to have your gallbladder removed. Some people need to stop drinking alcohol. If another disease is causing jaundice, treating the disease will cure the jaundice. If a medicine you take is causing jaundice, your doctor may switch you to another one. Follow-up care is a key part of your treatment and safety. Be sure to make and go to all appointments, and call your doctor if you are having problems. It's also a good idea to know your test results and keep a list of the medicines you take. How can you care for yourself at home? · Do not drink any alcohol until your jaundice is gone. Alcohol will damage the liver more. If your jaundice is caused by drinking alcohol, do not drink alcohol even when you are better. Tell your doctor if you need help to quit. Counseling, support groups, and sometimes medicines can help you stay sober. · Be safe with medicines. Do not take any other medicine without talking to your doctor first. This includes over-the-counter medicines, vitamins, and herbal products. · Make sure your doctor knows all the medicines you take. Some medicines, such as acetaminophen (Tylenol), can make liver problems worse.   · If you have itchy skin, keep cool and stay out of the sun. Try to wear cotton clothing. Talk to your doctor about medicines that can be used for itching. Follow the instructions on the label. · Lower your activity to match your energy. When should you call for help? Call 911 anytime you think you may need emergency care. For example, call if:    · You have severe trouble breathing.     · You passed out (lost consciousness). Call your doctor now or seek immediate medical care if:    · You are confused, or your confusion gets worse.     · You have a fever or chills.     · You have severe pain or swelling in your belly.     · You are losing weight without trying.     · You are vomiting or feel sick to your stomach.     · Your urine is dark yellow-brown, or your stools are light-colored. Watch closely for changes in your health, and be sure to contact your doctor if:    · You do not get better as expected. Where can you learn more? Go to http://www.gray.com/  Enter S047 in the search box to learn more about \"Jaundice: Care Instructions. \"  Current as of: February 10, 2021               Content Version: 13.0  © 8855-9069 [employer]. Care instructions adapted under license by [employer] (which disclaims liability or warranty for this information). If you have questions about a medical condition or this instruction, always ask your healthcare professional. [address] any warranty or liability for your use of this information. Patient Education        Liver Function Tests: About These Tests  What is it? Liver function tests check to see how well your liver is working. Some tests measure the amount of certain enzymes in your blood. This can show if the liver is damaged or inflamed. Other tests measure how well the liver can make certain proteins. Or they show how well the liver removes waste products from the body.   Your doctor will use the test results to help look for conditions such as hepatitis, cirrhosis, or gallbladder problems. Test results that are not normal don't always mean there is a problem with your liver. Your doctor can find out if there is a problem based on your results. The tests may include:  Alkaline phosphatase (ALP). This test measures the amount of the enzyme ALP in your blood. Total protein and albumin. A total serum protein test measures the amounts of two major groups of proteins in your blood (albumin and globulin). It also shows the total amount of protein. An albumin test measures albumin only. Bilirubin. This test measures the amount of bilirubin in your blood. When levels are high, the skin and whites of the eyes may look yellow (jaundice). This may be caused by liver disease. Aspartate aminotransferase (AST) and alanine aminotransferase (ALT). These tests measure the amount of the enzymes AST and ALT in your blood. Why is this test done? Liver function tests check how well your liver is working. Some tests help show if your liver is damaged or inflamed. Your doctor may order liver function tests if you have symptoms of liver disease. These tests also may be done to see how well a treatment for liver disease is working. How do you prepare for the test?  In general, there's nothing you have to do before this test, unless your doctor tells you to. How is the test done? A health professional uses a needle to take a blood sample, usually from the arm. What happens after the test?  · You will probably be able to go home right away. · You can go back to your usual activities right away. Follow-up care is a key part of your treatment and safety. Be sure to make and go to all appointments, and call your doctor if you are having problems. It's also a good idea to keep a list of the medicines you take. Ask your doctor when you can expect to have your test results. Where can you learn more?   Go to http://www.[employer].com/  Enter X622195 in the search box to learn more about \"Liver Function Tests: About These Tests. \"  Current as of: June 17, 2021               Content Version: 13.0  © 9368-1587 [employer]. Care instructions adapted under license by [employer] (which disclaims liability or warranty for this information). If you have questions about a medical condition or this instruction, always ask your healthcare professional. [address] any warranty or liability for your use of this information.

## 2021-09-22 NOTE — PROGRESS NOTES
Problem: Falls - Risk of  Goal: *Absence of Falls  Description: Document Flakita Isabella Fall Risk and appropriate interventions in the flowsheet. Outcome: Resolved/Met     Problem: Patient Education: Go to Patient Education Activity  Goal: Patient/Family Education  Outcome: Resolved/Met     Problem: Pressure Injury - Risk of  Goal: *Prevention of pressure injury  Description: Document Linus Scale and appropriate interventions in the flowsheet.   Outcome: Resolved/Met     Problem: Patient Education: Go to Patient Education Activity  Goal: Patient/Family Education  Outcome: Resolved/Met

## 2021-09-23 LAB
CMV DNA SERPL NAA+PROBE-ACNC: NEGATIVE IU/ML
CMV DNA SERPL NAA+PROBE-LOG IU: NORMAL LOG10 IU/ML

## 2021-09-24 LAB
EBV DNA SPEC QL NAA+PROBE: POSITIVE
SPECIMEN SOURCE: ABNORMAL

## 2021-10-01 NOTE — PROGRESS NOTES
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 .  After study can the patient's condition be further specified as: The medical record reflects the following:  Risk Factors: PTA Lipitor, Bilanta  Clinical Indicators: 62 YOF presented with jaundice, transaminitis, and hyperbilirubinemia. DC summary notes was concerning for medication induced liver disease. Lipitor and Jeral Jaspreet was stopped at discharge. Patients PCR was positive for Leilani Ridgel. Elevated LFTs.  CTAP: Hepatic steatosis. Treatment: Monitoring, GI consult, stopped Lipitor and Brilanta    Thank you,  Melissa Ngo RN,C BSN  Clinical   [email or web address]  Options provided:  -- Toxic Liver Disease due to medications  -- Acute Hepatitis due to Florian-Barr virus  -- Acute Hepatitis due to Florian-Barr virus and Toxic Liver disease due to  medications  -- Other - I will add my own diagnosis  -- Disagree - Not applicable / Not valid  -- Disagree - Clinically unable to determine / Unknown  -- Refer to Clinical Documentation Reviewer    PROVIDER RESPONSE TEXT:    This patient has Acute Hepatitis due to Florian-Barr and 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 Query created by:  Keyana Cornelius on 2021 9:35 PM      Electronically signed by:  Leo Dawson MD 10/1/2021 7:28 AM

## 2021-10-27 ENCOUNTER — TRANSCRIBE ORDER (OUTPATIENT)
Dept: SCHEDULING | Age: 61
End: 2021-10-27

## 2021-10-27 DIAGNOSIS — R79.89 ELEVATED LFTS: Primary | ICD-10-CM
